# Patient Record
Sex: MALE | Race: WHITE | NOT HISPANIC OR LATINO | Employment: OTHER | URBAN - METROPOLITAN AREA
[De-identification: names, ages, dates, MRNs, and addresses within clinical notes are randomized per-mention and may not be internally consistent; named-entity substitution may affect disease eponyms.]

---

## 2022-12-29 LAB
ABO: NORMAL
ANTIBODY SCREEN: NORMAL
ESTIMATED AVERAGE GLUCOSE: 163 MG/DL
HBA1C MFR BLD: 7.3 %
RH TYPE: NORMAL

## 2023-09-16 ENCOUNTER — HOSPITAL ENCOUNTER (OUTPATIENT)
Facility: HOSPITAL | Age: 73
Setting detail: OUTPATIENT SURGERY
End: 2023-09-16
Attending: ORTHOPAEDIC SURGERY | Admitting: ORTHOPAEDIC SURGERY
Payer: MEDICARE

## 2023-10-22 PROBLEM — G47.33 OSA (OBSTRUCTIVE SLEEP APNEA): Status: ACTIVE | Noted: 2017-01-06

## 2023-10-22 PROBLEM — I87.2 STASIS DERMATITIS: Status: ACTIVE | Noted: 2023-10-22

## 2023-10-22 PROBLEM — I73.9 PVD (PERIPHERAL VASCULAR DISEASE) (CMS-HCC): Status: ACTIVE | Noted: 2021-02-24

## 2023-10-22 PROBLEM — N40.0 ENLARGED PROSTATE: Status: ACTIVE | Noted: 2023-10-22

## 2023-10-22 PROBLEM — R97.20 ELEVATED PSA: Status: ACTIVE | Noted: 2023-10-22

## 2023-10-22 PROBLEM — I10 ELEVATED BLOOD PRESSURE READING IN OFFICE WITH DIAGNOSIS OF HYPERTENSION: Status: ACTIVE | Noted: 2017-01-06

## 2023-10-22 PROBLEM — Z86.16 HISTORY OF COVID-19: Status: ACTIVE | Noted: 2022-03-18

## 2023-10-22 PROBLEM — E78.2 MIXED HYPERLIPIDEMIA: Status: ACTIVE | Noted: 2023-10-22

## 2023-10-22 PROBLEM — J30.9 ALLERGIC RHINITIS: Status: ACTIVE | Noted: 2023-10-22

## 2023-10-22 PROBLEM — N18.30 CKD (CHRONIC KIDNEY DISEASE), STAGE III (MULTI): Status: ACTIVE | Noted: 2017-01-06

## 2023-10-22 PROBLEM — E11.8 TYPE 2 DIABETES MELLITUS WITH COMPLICATION, WITHOUT LONG-TERM CURRENT USE OF INSULIN (MULTI): Status: ACTIVE | Noted: 2023-10-22

## 2023-10-22 PROBLEM — M79.3 LIPODERMATOSCLEROSIS: Status: ACTIVE | Noted: 2023-10-22

## 2023-10-22 PROBLEM — J44.89 COPD WITH ASTHMA (MULTI): Status: ACTIVE | Noted: 2023-10-22

## 2023-10-22 RX ORDER — ALBUTEROL SULFATE 0.83 MG/ML
2.5 SOLUTION RESPIRATORY (INHALATION) EVERY 6 HOURS PRN
COMMUNITY
Start: 2020-03-23 | End: 2023-12-14 | Stop reason: ENTERED-IN-ERROR

## 2023-10-22 RX ORDER — INSULIN DETEMIR 100 [IU]/ML
40 INJECTION, SOLUTION SUBCUTANEOUS NIGHTLY
COMMUNITY
Start: 2023-01-09 | End: 2023-10-24 | Stop reason: ENTERED-IN-ERROR

## 2023-10-22 RX ORDER — FELODIPINE 5 MG/1
2 TABLET, EXTENDED RELEASE ORAL DAILY
COMMUNITY
Start: 2022-09-06

## 2023-10-22 RX ORDER — EZETIMIBE 10 MG/1
1 TABLET ORAL
COMMUNITY

## 2023-10-22 RX ORDER — IBUPROFEN 200 MG
CAPSULE ORAL 2 TIMES DAILY
COMMUNITY
Start: 2023-02-01 | End: 2023-10-24 | Stop reason: ENTERED-IN-ERROR

## 2023-10-22 RX ORDER — DULAGLUTIDE 3 MG/.5ML
3 INJECTION, SOLUTION SUBCUTANEOUS WEEKLY
COMMUNITY

## 2023-10-22 RX ORDER — FLUTICASONE PROPIONATE AND SALMETEROL 500; 50 UG/1; UG/1
1 POWDER RESPIRATORY (INHALATION) 2 TIMES DAILY PRN
COMMUNITY

## 2023-10-22 RX ORDER — ALBUTEROL SULFATE 90 UG/1
2 AEROSOL, METERED RESPIRATORY (INHALATION) EVERY 4 HOURS PRN
COMMUNITY

## 2023-10-22 RX ORDER — SODIUM CHLORIDE 0.9 % (FLUSH) 0.9 %
10 SYRINGE (ML) INJECTION
COMMUNITY
Start: 2022-12-30 | End: 2023-10-24 | Stop reason: ENTERED-IN-ERROR

## 2023-10-22 RX ORDER — FUROSEMIDE 20 MG/1
TABLET ORAL AS NEEDED
COMMUNITY
Start: 2020-03-16 | End: 2023-10-24 | Stop reason: ENTERED-IN-ERROR

## 2023-10-22 RX ORDER — ACETAMINOPHEN 500 MG
1 TABLET ORAL DAILY
COMMUNITY

## 2023-10-22 RX ORDER — ATORVASTATIN CALCIUM 40 MG/1
1 TABLET, FILM COATED ORAL
COMMUNITY

## 2023-10-22 RX ORDER — ASCORBIC ACID 500 MG
500 TABLET ORAL
COMMUNITY

## 2023-10-22 RX ORDER — PREGABALIN 50 MG/1
50 CAPSULE ORAL 3 TIMES DAILY
COMMUNITY
Start: 2022-12-07 | End: 2023-12-14 | Stop reason: ENTERED-IN-ERROR

## 2023-10-22 RX ORDER — ATENOLOL 50 MG/1
1 TABLET ORAL DAILY
COMMUNITY
Start: 2023-03-27

## 2023-10-22 RX ORDER — LOSARTAN POTASSIUM AND HYDROCHLOROTHIAZIDE 25; 100 MG/1; MG/1
1 TABLET ORAL DAILY
COMMUNITY
Start: 2023-01-11

## 2023-10-24 ENCOUNTER — ANCILLARY PROCEDURE (OUTPATIENT)
Dept: PREADMISSION TESTING | Facility: HOSPITAL | Age: 73
End: 2023-10-24
Payer: MEDICARE

## 2023-10-24 ENCOUNTER — LAB (OUTPATIENT)
Dept: LAB | Facility: LAB | Age: 73
End: 2023-10-24
Payer: MEDICARE

## 2023-10-24 VITALS
SYSTOLIC BLOOD PRESSURE: 167 MMHG | TEMPERATURE: 97.9 F | HEIGHT: 72 IN | DIASTOLIC BLOOD PRESSURE: 73 MMHG | WEIGHT: 299.38 LBS | HEART RATE: 60 BPM | OXYGEN SATURATION: 97 % | BODY MASS INDEX: 40.55 KG/M2 | RESPIRATION RATE: 18 BRPM

## 2023-10-24 DIAGNOSIS — M17.11 UNILATERAL PRIMARY OSTEOARTHRITIS, RIGHT KNEE: ICD-10-CM

## 2023-10-24 DIAGNOSIS — Z01.818 PREOP TESTING: Primary | ICD-10-CM

## 2023-10-24 DIAGNOSIS — Z01.818 PREOP TESTING: ICD-10-CM

## 2023-10-24 LAB
ANION GAP SERPL CALC-SCNC: 13 MMOL/L (ref 10–20)
BUN SERPL-MCNC: 20 MG/DL (ref 6–23)
CALCIUM SERPL-MCNC: 9.1 MG/DL (ref 8.6–10.3)
CHLORIDE SERPL-SCNC: 101 MMOL/L (ref 98–107)
CO2 SERPL-SCNC: 28 MMOL/L (ref 21–32)
CREAT SERPL-MCNC: 1.14 MG/DL (ref 0.5–1.3)
ERYTHROCYTE [DISTWIDTH] IN BLOOD BY AUTOMATED COUNT: 12.2 % (ref 11.5–14.5)
GFR SERPL CREATININE-BSD FRML MDRD: 68 ML/MIN/1.73M*2
GLUCOSE SERPL-MCNC: 162 MG/DL (ref 74–99)
HCT VFR BLD AUTO: 39.7 % (ref 41–52)
HGB BLD-MCNC: 12.9 G/DL (ref 13.5–17.5)
MCH RBC QN AUTO: 29.5 PG (ref 26–34)
MCHC RBC AUTO-ENTMCNC: 32.5 G/DL (ref 32–36)
MCV RBC AUTO: 91 FL (ref 80–100)
NRBC BLD-RTO: 0 /100 WBCS (ref 0–0)
PLATELET # BLD AUTO: 260 X10*3/UL (ref 150–450)
PMV BLD AUTO: 10.6 FL (ref 7.5–11.5)
POTASSIUM SERPL-SCNC: 4.2 MMOL/L (ref 3.5–5.3)
RBC # BLD AUTO: 4.38 X10*6/UL (ref 4.5–5.9)
SODIUM SERPL-SCNC: 138 MMOL/L (ref 136–145)
WBC # BLD AUTO: 7.6 X10*3/UL (ref 4.4–11.3)

## 2023-10-24 PROCEDURE — 85027 COMPLETE CBC AUTOMATED: CPT

## 2023-10-24 PROCEDURE — 93010 ELECTROCARDIOGRAM REPORT: CPT | Performed by: INTERNAL MEDICINE

## 2023-10-24 PROCEDURE — 93005 ELECTROCARDIOGRAM TRACING: CPT

## 2023-10-24 PROCEDURE — 99202 OFFICE O/P NEW SF 15 MIN: CPT | Performed by: NURSE PRACTITIONER

## 2023-10-24 PROCEDURE — 87081 CULTURE SCREEN ONLY: CPT

## 2023-10-24 PROCEDURE — 36415 COLL VENOUS BLD VENIPUNCTURE: CPT

## 2023-10-24 PROCEDURE — 80048 BASIC METABOLIC PNL TOTAL CA: CPT

## 2023-10-24 RX ORDER — INSULIN DEGLUDEC 100 U/ML
40 INJECTION, SOLUTION SUBCUTANEOUS NIGHTLY
COMMUNITY
End: 2023-10-24 | Stop reason: ALTCHOICE

## 2023-10-24 RX ORDER — METFORMIN HYDROCHLORIDE 500 MG/1
1000 TABLET ORAL
COMMUNITY

## 2023-10-24 RX ORDER — CHLORHEXIDINE GLUCONATE ORAL RINSE 1.2 MG/ML
SOLUTION DENTAL
Qty: 473 ML | Refills: 0 | Status: SHIPPED | OUTPATIENT
Start: 2023-10-24 | End: 2024-01-04 | Stop reason: HOSPADM

## 2023-10-24 ASSESSMENT — ACTIVITIES OF DAILY LIVING (ADL): ADL_SCORE: 0

## 2023-10-24 ASSESSMENT — DUKE ACTIVITY SCORE INDEX (DASI)
CAN YOU DO YARD WORK LIKE RAKING LEAVES, WEEDING OR PUSHING A MOWER: YES
CAN YOU PARTICIPATE IN STRENOUS SPORTS LIKE SWIMMING, SINGLES TENNIS, FOOTBALL, BASKETBALL, OR SKIING: NO
DASI METS SCORE: 6.6
CAN YOU TAKE CARE OF YOURSELF (EAT, DRESS, BATHE, OR USE TOILET): YES
CAN YOU RUN A SHORT DISTANCE: NO
CAN YOU WALK INDOORS, SUCH AS AROUND YOUR HOUSE: YES
CAN YOU WALK A BLOCK OR TWO ON LEVEL GROUND: YES
TOTAL_SCORE: 31.45
CAN YOU CLIMB A FLIGHT OF STAIRS OR WALK UP A HILL: YES
CAN YOU DO HEAVY WORK AROUND THE HOUSE LIKE SCRUBBING FLOORS OR LIFTING AND MOVING HEAVY FURNITURE: YES
CAN YOU DO MODERATE WORK AROUND THE HOUSE LIKE VACUUMING, SWEEPING FLOORS OR CARRYING GROCERIES: YES
CAN YOU DO LIGHT WORK AROUND THE HOUSE LIKE DUSTING OR WASHING DISHES: YES
CAN YOU PARTICIPATE IN MODERATE RECREATIONAL ACTIVITIES LIKE GOLF, BOWLING, DANCING, DOUBLES TENNIS OR THROWING A BASEBALL OR FOOTBALL: NO
CAN YOU HAVE SEXUAL RELATIONS: NO

## 2023-10-24 ASSESSMENT — CHADS2 SCORE
CHADS2 SCORE: 2
PRIOR STROKE OR TIA OR THROMBOEMBOLISM: NO
HYPERTENSION: YES
DIABETES: YES
CHF: NO
AGE GREATER THAN OR EQUAL TO 75: NO

## 2023-10-24 ASSESSMENT — ENCOUNTER SYMPTOMS
EYES NEGATIVE: 1
SHORTNESS OF BREATH: 1
CARDIOVASCULAR NEGATIVE: 1
NEUROLOGICAL NEGATIVE: 1
CONSTITUTIONAL NEGATIVE: 1
GASTROINTESTINAL NEGATIVE: 1
PSYCHIATRIC NEGATIVE: 1
ARTHRALGIAS: 1

## 2023-10-24 ASSESSMENT — LIFESTYLE VARIABLES: SMOKING_STATUS: NONSMOKER

## 2023-10-24 ASSESSMENT — PAIN SCALES - GENERAL: PAINLEVEL_OUTOF10: 0 - NO PAIN

## 2023-10-24 ASSESSMENT — PAIN - FUNCTIONAL ASSESSMENT: PAIN_FUNCTIONAL_ASSESSMENT: 0-10

## 2023-10-24 NOTE — PREPROCEDURE INSTRUCTIONS
No outpatient medications have been marked as taking for the 10/24/23 encounter (Clinical Support) with [facility].                 PRE-OPERATIVE INSTRUCTIONS    You will receive notification one business day prior to your surgery to confirm your arrival time and additional information. It is important that you answer your phone and/or check your messages during this time.    Please enter the building through the Outpatient entrance. Take the elevator off the lobby to the 2nd floor and check in at the Outpatient Surgery desk    INSTRUCTIONS:  Talk to your surgeon for instructions if you should stop your aspirin, blood thinner, or diabetes medicines.  DO NOT take any multivitamins or over the counter supplements for 7-10 days before surgery.  If not being admitted, you must have an adult immediately available to drive you home after surgery. We also highly recommend you have someone stay with you for the entire day and night of your surgery.  For children having surgery, a parent or legal guardian must accompany t hem to the surgery center. If this is not possible, please call [phone] to make additional arrangements.  For adults who are unable to consent or make medical decisions for themselves, a legal guardian or Power of  must accompany them to the surgery center. If this is not possible, please call [phone] to make additional arrangements.  Wear comfortable, loose fitting clothing.  All jewelry and piercings must be removed. If you are unable to remove an item or have a dermal piercing, please be sure to tell the nurse when you arrive for surgery.  Nail polish and make-up must be removed.  Avoid smoking or consuming alcohol for 24 hours before surgery.  To help prevent infection, please take a shower/bath and wash your hair the night before and/or morning of surgery.    Additional instructions about eating and drinking before surgery:  Do not eat any solid foods or drink anything but clear  liquids within 6 hours of your arrival time for surgery. Milk, nutritional drinks/supplements, and infant formula are considered solid foods.  You may drink up to 12 oz. of clear liquids up to 2 hours before your arrival time for surgery, unless directed otherwise by your surgeon. Clear liquids include water, non-carbonated sports drinks (Gatorade), black tea or coffee (no creamers) and breast milk.    If you received a blue folder, please review additional information provided inside the folder regarding additional preparation.     If you have any questions or concerns, please call Pre-Admission Testing at [phone].

## 2023-10-24 NOTE — CPM/PAT H&P
CPM/PAT Evaluation       Name: Franklin Ronquillo)  /Age: 1950/73 y.o.     In-Person       Chief Complaint: Right knee replacement    HPI Patient presents for right total knee replacement 23. He had left total knee replacement done in January and denies complications. He is c/o right anterior knee pain worsening for years. He is ambulating unassisted. Denies cuts, bruises or wounds to right knee or RLE. Wears bilateral compression stockings that he has worn over the last 20 years.    Past Medical History:   Diagnosis Date    Asthma     CKD (chronic kidney disease)     COPD (chronic obstructive pulmonary disease) (CMS/[facility])     Diabetes mellitus (CMS/[facility])     Hyperlipidemia     Hypertension     PVD (peripheral vascular disease) (CMS/[facility])     Sleep apnea        Past Surgical History:   Procedure Laterality Date    CARPAL TUNNEL RELEASE Bilateral     ELBOW SURGERY      INSERT / REPLACE / REMOVE PACEMAKER      JOINT REPLACEMENT      SPINE SURGERY         Patient  reports that he is not currently sexually active.    Family History   Problem Relation Name Age of Onset    Cancer Mother      Coronary artery disease Father      Cancer Father         No Known Allergies    Prior to Admission medications    Medication Sig Start Date End Date Taking? Authorizing Provider   albuterol 2.5 mg /3 mL (0.083 %) nebulizer solution Inhale 3 mL (2.5 mg) every 6 hours if needed for shortness of breath or wheezing. Inhale by nebulizer over 5-15 minutes 3/23/20   Historical Provider, MD   albuterol 90 mcg/actuation inhaler 2 puffs every 4 hours if needed for wheezing or shortness of breath. 1/3/23   Historical Provider, MD   ascorbic acid (Vitamin C) 500 mg tablet Take 1 tablet (500 mg) by mouth once daily.    Historical Provider, MD   atenolol (Tenormin) 50 mg tablet Take 1 tablet (50 mg) by mouth once daily. 3/27/23   Historical Provider, MD   atorvastatin (Lipitor) 40 mg tablet Take 1 tablet (40 mg) by mouth  once daily. 4/12/23   Historical Provider, MD   chlorhexidine (Peridex) 0.12 % solution 15 milliliter(s) orally once a day for 2 doses 15 ml  the night before surgery and 15 ml morning of surgery - swish for 30 seconds -DO NOT SWALLOW, SPIT OUT 10/24/23   MUMTAZ Almanza-CNP   cholecalciferol (Vitamin D-3) 50 mcg (2,000 unit) capsule Take 1 capsule (2,000 Units) by mouth early in the morning..    Historical Provider, MD   dulaglutide (Trulicity) 3 mg/0.5 mL pen injector Inject 3 mg under the skin once a week. 3/22/22   Historical Provider, MD   ezetimibe (Zetia) 10 mg tablet Take 1 tablet (10 mg) by mouth once daily. 10/10/22   Historical Provider, MD   felodipine ER (Plendil) 5 mg 24 hr tablet Take 2 tablets (10 mg) by mouth once daily. 9/6/22   Historical Provider, MD   fexofenadine HCl (ALLEGRA ORAL) Take 180 mg by mouth once daily.    Historical Provider, MD   fluticasone propion-salmeteroL (Wixela Inhub) 500-50 mcg/dose diskus inhaler Inhale 1 puff 2 times a day. As instructed 4/24/23   Historical Provider, MD   GARLIC ORAL Take 1 tablet by mouth once daily.    Historical Provider, MD   insulin degludec (Tresiba U-100 Insulin) 100 unit/mL injection Inject 40 Units under the skin once daily at bedtime. Take as directed per insulin instructions.    Historical Provider, MD   losartan-hydrochlorothiazide (Hyzaar) 100-25 mg tablet Take 1 tablet by mouth once daily. 1/11/23   Historical Provider, MD   pregabalin (Lyrica) 50 mg capsule Take 1 capsule (50 mg) by mouth 3 times a day. For 90 days 12/7/22   Historical Provider, MD   vitamin E acetate (VITAMIN E ORAL) Take 1,000 Units by mouth once daily.    Historical Provider, MD   blood sugar diagnostic (Blood Glucose Test) strip 2 times a day. As directed 2/1/23 10/24/23  Historical Provider, MD   furosemide (Lasix) 20 mg tablet if needed (swelling). 3/16/20 10/24/23  Historical Provider, MD   insulin detemir (Levemir FlexPen) 100 unit/mL (3 mL) pen Inject 40  Units under the skin once daily at bedtime. 1/9/23 10/24/23  Historical Provider, MD   PERFLUTREN LIPID MICROSPHERES IV Infuse into a venous catheter.  perflutren lipid microspheres 1.3 mL in NaCl (PF) 0.9% 10 mL injection (DEFINITY) 12/30/22 10/24/23  Historical Provider, MD   sodium chloride 0.9% (sodium chloride) flush Infuse 10 mL into a venous catheter. 12/30/22 10/24/23  Historical Provider, MD      Refer to updated medication list on file    Review of Systems   Constitutional: Negative.    HENT: Negative.     Eyes: Negative.         Glasses   Respiratory:  Positive for shortness of breath (chronic with exertion).    Cardiovascular: Negative.         Wearing bilateral compression stockings   Gastrointestinal: Negative.    Genitourinary: Negative.    Musculoskeletal:  Positive for arthralgias.   Skin: Negative.    Neurological: Negative.    Psychiatric/Behavioral: Negative.          Physical Exam  Constitutional:       Appearance: Normal appearance.   HENT:      Head: Normocephalic.   Eyes:      Extraocular Movements: Extraocular movements intact.   Cardiovascular:      Rate and Rhythm: Normal rate and regular rhythm.      Heart sounds: Normal heart sounds.   Pulmonary:      Effort: Pulmonary effort is normal.      Breath sounds: Normal breath sounds.   Abdominal:      General: Bowel sounds are normal.      Palpations: Abdomen is soft.   Musculoskeletal:         General: Normal range of motion.      Cervical back: Normal range of motion.   Skin:     General: Skin is warm and dry.   Neurological:      Mental Status: He is alert and oriented to person, place, and time.   Psychiatric:         Mood and Affect: Mood normal.          PAT AIRWAY:   Airway:     Neck ROM::  Full   Several missing teeth, denies loose teeth    Anesthesia:  Patient denies any anesthesia complications.     Visit Vitals  /73   Pulse 60   Temp 36.6 °C (97.9 °F) (Temporal)   Resp 18       DASI Risk Score      Flowsheet Row Most Recent  Value   DASI SCORE 31.45   METS Score (Will be calculated only when all the questions are answered) 6.6          Caprini DVT Assessment      Flowsheet Row Most Recent Value   DVT Score 16   Current Status Major surgery planned, including arthroscopic and laproscopic (1-2 hours), Elective major lower extremity arthroplasty, Swollen legs, COPD   History Prior major surgery, COPD   Age 60-75 years   BMI 31-40 (Obesity)          Modified Frailty Index      Flowsheet Row Most Recent Value   Modified Frailty Index Calculator .3636          CHADS2 Stroke Risk  Current as of 3 minutes ago        N/A 3 - 100%: High Risk   2 - 3%: Medium Risk   0 - 2%: Low Risk     Last Change: N/A          This score determines the patient's risk of having a stroke if the patient has atrial fibrillation.        This score is not applicable to this patient. Components are not calculated.          Revised Cardiac Risk Index    No data to display       Apfel Simplified Score    No data to display       Risk Analysis Index Results This Encounter         10/24/2023  1349             GOODMAN Cancer History: Patient does not indicate history of cancer    Total Risk Analysis Index Score Without Cancer: 33    Total Risk Analysis Index Score: 33          Stop Bang Score      Flowsheet Row Most Recent Value   Do you snore loudly? 0   Do you often feel tired or fatigued after your sleep? 0   Has anyone ever observed you stop breathing in your sleep? 1   Do you have or are you being treated for high blood pressure? 1   Recent BMI (Calculated) 40.6   Is BMI greater than 35 kg/m2? 1=Yes   Age older than 50 years old? 1=Yes   Is your neck circumference greater than 17 inches (Male) or 16 inches (Female)? 1          Hemoglobin A1C   Date Value Ref Range Status   07/31/2023 7.6 (H) 4.3 - 5.6 % Final     Comment:     American Diabetes Association guidelines indicate that patients with HgbA1c in the range 5.7-6.4% are at increased risk for development of diabetes,  and intervention by lifestyle modification may be beneficial. HgbA1c greater or equal to 6.5% is considered diagnostic of diabetes.       Assessment and Plan:     73 year old male for right total knee replacement 11/6/23  EKG obtained and enclosed with comparison EKG 12/2022 on file  BMP and CBC today  MRSA swab and oral mouth rinse as ordered per protocol  Sleep apnea uses CPAP  Has pacemaker  Follows with cardiologist Dr. Olivia along with Dr. Lujan. Preop cardiovascular exam 12/30/22 on file  ECHO report on file 1/3/23  He is instructed to notify surgeon office if any new skin changes occur to surgical limb.

## 2023-10-26 LAB
ATRIAL RATE: 60 BPM
PR INTERVAL: 328 MS
Q ONSET: 216 MS
QRS COUNT: 10 BEATS
QRS DURATION: 138 MS
QT INTERVAL: 442 MS
QTC CALCULATION(BAZETT): 442 MS
QTC FREDERICIA: 442 MS
R AXIS: -86 DEGREES
STAPHYLOCOCCUS SPEC CULT: NORMAL
T AXIS: 76 DEGREES
T OFFSET: 437 MS
VENTRICULAR RATE: 60 BPM

## 2023-12-14 ENCOUNTER — LAB (OUTPATIENT)
Dept: LAB | Facility: LAB | Age: 73
End: 2023-12-14
Payer: MEDICARE

## 2023-12-14 ENCOUNTER — PRE-ADMISSION TESTING (OUTPATIENT)
Dept: PREADMISSION TESTING | Facility: HOSPITAL | Age: 73
End: 2023-12-14
Payer: MEDICARE

## 2023-12-14 VITALS
BODY MASS INDEX: 41.65 KG/M2 | RESPIRATION RATE: 16 BRPM | DIASTOLIC BLOOD PRESSURE: 77 MMHG | HEART RATE: 66 BPM | HEIGHT: 72 IN | SYSTOLIC BLOOD PRESSURE: 159 MMHG | OXYGEN SATURATION: 97 % | TEMPERATURE: 97 F | WEIGHT: 307.5 LBS

## 2023-12-14 DIAGNOSIS — M17.11 UNILATERAL PRIMARY OSTEOARTHRITIS, RIGHT KNEE: ICD-10-CM

## 2023-12-14 DIAGNOSIS — Z01.818 PRE-OP TESTING: ICD-10-CM

## 2023-12-14 DIAGNOSIS — Z01.818 PRE-OP TESTING: Primary | ICD-10-CM

## 2023-12-14 LAB
ANION GAP SERPL CALC-SCNC: 14 MMOL/L (ref 10–20)
BUN SERPL-MCNC: 21 MG/DL (ref 6–23)
CALCIUM SERPL-MCNC: 9.6 MG/DL (ref 8.6–10.3)
CHLORIDE SERPL-SCNC: 100 MMOL/L (ref 98–107)
CO2 SERPL-SCNC: 29 MMOL/L (ref 21–32)
CREAT SERPL-MCNC: 1.18 MG/DL (ref 0.5–1.3)
ERYTHROCYTE [DISTWIDTH] IN BLOOD BY AUTOMATED COUNT: 12.4 % (ref 11.5–14.5)
GFR SERPL CREATININE-BSD FRML MDRD: 65 ML/MIN/1.73M*2
GLUCOSE SERPL-MCNC: 234 MG/DL (ref 74–99)
HCT VFR BLD AUTO: 40.8 % (ref 41–52)
HGB BLD-MCNC: 13.3 G/DL (ref 13.5–17.5)
MCH RBC QN AUTO: 29.1 PG (ref 26–34)
MCHC RBC AUTO-ENTMCNC: 32.6 G/DL (ref 32–36)
MCV RBC AUTO: 89 FL (ref 80–100)
NRBC BLD-RTO: 0 /100 WBCS (ref 0–0)
PLATELET # BLD AUTO: 241 X10*3/UL (ref 150–450)
POTASSIUM SERPL-SCNC: 4.6 MMOL/L (ref 3.5–5.3)
RBC # BLD AUTO: 4.57 X10*6/UL (ref 4.5–5.9)
SODIUM SERPL-SCNC: 138 MMOL/L (ref 136–145)
WBC # BLD AUTO: 7.2 X10*3/UL (ref 4.4–11.3)

## 2023-12-14 PROCEDURE — 85027 COMPLETE CBC AUTOMATED: CPT

## 2023-12-14 PROCEDURE — 93005 ELECTROCARDIOGRAM TRACING: CPT

## 2023-12-14 PROCEDURE — 80048 BASIC METABOLIC PNL TOTAL CA: CPT

## 2023-12-14 PROCEDURE — 99203 OFFICE O/P NEW LOW 30 MIN: CPT | Performed by: NURSE PRACTITIONER

## 2023-12-14 PROCEDURE — 87081 CULTURE SCREEN ONLY: CPT | Mod: STJLAB

## 2023-12-14 PROCEDURE — 36415 COLL VENOUS BLD VENIPUNCTURE: CPT

## 2023-12-14 PROCEDURE — 93010 ELECTROCARDIOGRAM REPORT: CPT | Performed by: INTERNAL MEDICINE

## 2023-12-14 RX ORDER — FAMOTIDINE 20 MG/1
20 TABLET, FILM COATED ORAL DAILY
COMMUNITY

## 2023-12-14 RX ORDER — INSULIN DEGLUDEC 100 U/ML
40 INJECTION, SOLUTION SUBCUTANEOUS NIGHTLY
COMMUNITY

## 2023-12-14 RX ORDER — NYSTATIN AND TRIAMCINOLONE ACETONIDE 100000; 1 [USP'U]/G; MG/G
1 CREAM TOPICAL 2 TIMES DAILY
COMMUNITY

## 2023-12-14 RX ORDER — PREGABALIN 75 MG/1
75 CAPSULE ORAL 3 TIMES DAILY
COMMUNITY

## 2023-12-14 RX ORDER — ALBUTEROL SULFATE 0.83 MG/ML
2.5 SOLUTION RESPIRATORY (INHALATION) EVERY 8 HOURS PRN
COMMUNITY

## 2023-12-14 ASSESSMENT — DUKE ACTIVITY SCORE INDEX (DASI)
DASI METS SCORE: 6.3
CAN YOU DO HEAVY WORK AROUND THE HOUSE LIKE SCRUBBING FLOORS OR LIFTING AND MOVING HEAVY FURNITURE: NO
TOTAL_SCORE: 28.7
CAN YOU WALK A BLOCK OR TWO ON LEVEL GROUND: YES
CAN YOU DO YARD WORK LIKE RAKING LEAVES, WEEDING OR PUSHING A MOWER: YES
CAN YOU DO LIGHT WORK AROUND THE HOUSE LIKE DUSTING OR WASHING DISHES: YES
CAN YOU TAKE CARE OF YOURSELF (EAT, DRESS, BATHE, OR USE TOILET): YES
CAN YOU PARTICIPATE IN STRENOUS SPORTS LIKE SWIMMING, SINGLES TENNIS, FOOTBALL, BASKETBALL, OR SKIING: NO
CAN YOU PARTICIPATE IN MODERATE RECREATIONAL ACTIVITIES LIKE GOLF, BOWLING, DANCING, DOUBLES TENNIS OR THROWING A BASEBALL OR FOOTBALL: NO
CAN YOU HAVE SEXUAL RELATIONS: YES
CAN YOU WALK INDOORS, SUCH AS AROUND YOUR HOUSE: YES
CAN YOU CLIMB A FLIGHT OF STAIRS OR WALK UP A HILL: YES
CAN YOU DO MODERATE WORK AROUND THE HOUSE LIKE VACUUMING, SWEEPING FLOORS OR CARRYING GROCERIES: YES
CAN YOU RUN A SHORT DISTANCE: NO

## 2023-12-14 ASSESSMENT — CHADS2 SCORE
HYPERTENSION: YES
PRIOR STROKE OR TIA OR THROMBOEMBOLISM: NO
DIABETES: YES
CHF: NO
CHADS2 SCORE: 2
AGE GREATER THAN OR EQUAL TO 75: NO

## 2023-12-14 ASSESSMENT — LIFESTYLE VARIABLES: SMOKING_STATUS: NONSMOKER

## 2023-12-14 ASSESSMENT — PAIN - FUNCTIONAL ASSESSMENT: PAIN_FUNCTIONAL_ASSESSMENT: 0-10

## 2023-12-14 ASSESSMENT — PAIN SCALES - GENERAL: PAINLEVEL_OUTOF10: 0 - NO PAIN

## 2023-12-14 ASSESSMENT — ACTIVITIES OF DAILY LIVING (ADL): ADL_SCORE: 0

## 2023-12-14 NOTE — CPM/PAT H&P
CPM/PAT Evaluation       Name: Franklin Ronquillo (Franklin Ronquillo)  /Age: 1950/73 y.o.     In-Person       Chief Complaint: right knee pains    HPI    CF is a 72 yo male who has been having right knee pains for several years and now pain has worsened- imaging shows right knee OA- subsequently scheduled for right TKR.  Original surgery scheduled for 2023 but consequently acquired COVID and surgery had to be canceled and rescheduled-upcoming surgery date is in 2024. No recent steroid injections. Skin intact. Otherwise denies any recent illness, fever/chills, chest pains or shortness of breath.     Past Medical History:   Diagnosis Date    Arthritis     Asthma     CKD (chronic kidney disease)     COPD (chronic obstructive pulmonary disease) (CMS/HCC)     Diabetes mellitus (CMS/HCC)     Hyperlipidemia     Hypertension     Intermittent complete heart block (CMS/HCC)     Lipodermatosclerosis     Lumbar disc disease     Lumbar spinal stenosis     Pacemaker     PVD (peripheral vascular disease) (CMS/HCC)     Sinus node dysfunction (CMS/HCC)     Sleep apnea     with cpap     PCP: Dr. Leung  Cards: Dr. Olivia  EP: Dr. Hines    Echo   - The left ventricle is normal in size. Left ventricular systolic function is   normal. EF = 62 ± 5% (2D biplane)   - The right ventricle is normal in size. Right ventricular systolic function is   normal.   - The left atrial cavity is mildly dilated.   - The right atrial cavity is dilated.   - The visualized aorta is borderline dilated with a maximal dimension of 4.0 cm.     Oct 2023 Device check:     Device Type:dual-chamber pacemaker     :  [employer]     Full device integration was done.     Device function was normal Yes.     Battery voltage within acceptable range     The underlying rhythm is Sinus bradycardia with AV block     Arrhythmia log reveals no significant episodes  (he had few seconds of A-fib)     The skin on top of the device appears  to be intact without signs of infection    Hemoglobin A1C   Date Value Ref Range Status   07/31/2023 7.6 (H) 4.3 - 5.6 % Final     Comment:     American Diabetes Association guidelines indicate that patients with HgbA1c in the range 5.7-6.4% are at increased risk for development of diabetes, and intervention by lifestyle modification may be beneficial. HgbA1c greater or equal to 6.5% is considered diagnostic of diabetes.           Past Surgical History:   Procedure Laterality Date    CARPAL TUNNEL RELEASE Bilateral     COLONOSCOPY      ELBOW SURGERY      INSERT / REPLACE / REMOVE PACEMAKER      JOINT REPLACEMENT      SPINE SURGERY         Patient  reports that he is not currently sexually active.    Family History   Problem Relation Name Age of Onset    Cancer Mother      Coronary artery disease Father      Cancer Father      Diabetes Father         No Known Allergies    Prior to Admission medications    Medication Sig Start Date End Date Taking? Authorizing Provider   albuterol 90 mcg/actuation inhaler 2 puffs every 4 hours if needed for wheezing or shortness of breath.    Historical Provider, MD   ascorbic acid (Vitamin C) 500 mg tablet Take 1 tablet (500 mg) by mouth once daily.    Historical Provider, MD   atenolol (Tenormin) 50 mg tablet Take 1 tablet (50 mg) by mouth once daily. 3/27/23   Historical Provider, MD   atorvastatin (Lipitor) 40 mg tablet Take 1 tablet (40 mg) by mouth. In the afternoon    Historical Provider, MD   chlorhexidine (Peridex) 0.12 % solution 15 milliliter(s) orally once a day for 2 doses 15 ml  the night before surgery and 15 ml morning of surgery - swish for 30 seconds -DO NOT SWALLOW, SPIT OUT 10/24/23   MUMTAZ Almanza-CNP   cholecalciferol (Vitamin D-3) 50 mcg (2,000 unit) capsule Take 1 capsule (2,000 Units) by mouth early in the morning..    Historical Provider, MD   dulaglutide (Trulicity) 3 mg/0.5 mL pen injector Inject 3 mg under the skin once a week. Wednesdays     Historical Provider, MD   ezetimibe (Zetia) 10 mg tablet Take 1 tablet (10 mg) by mouth. In the afternoon    Historical Provider, MD   felodipine ER (Plendil) 5 mg 24 hr tablet Take 2 tablets (10 mg) by mouth once daily. 9/6/22   Historical Provider, MD   fexofenadine HCl (ALLEGRA ORAL) Take 180 mg by mouth once daily.    Historical Provider, MD   fluticasone propion-salmeteroL (Wixela Inhub) 500-50 mcg/dose diskus inhaler Inhale 1 puff 2 times a day.    Historical Provider, MD   garlic tablet Take 1 tablet by mouth once daily.    Historical Provider, MD   insulin degludec (Tresiba FlexTouch U-100) 100 unit/mL (3 mL) injection Inject 40 Units under the skin once daily at bedtime. Take as directed per insulin instructions.    Historical Provider, MD   losartan-hydrochlorothiazide (Hyzaar) 100-25 mg tablet Take 1 tablet by mouth once daily. 1/11/23   Historical Provider, MD   metFORMIN (Glucophage) 500 mg tablet Take 2 tablets (1,000 mg) by mouth 2 times a day with meals.    Historical Provider, MD   nystatin-triamcinolone (Mycolog II) cream Apply 1 Application topically 2 times a day.    Historical Provider, MD   omega 3-dha-epa-fish oil-krill 700 mg-600 mg- 900 mg capsule Take 1 capsule by mouth once daily.    Historical Provider, MD   pregabalin (Lyrica) 75 mg capsule Take 1 capsule (75 mg) by mouth 3 times a day.    Historical Provider, MD   vitamin E acetate (VITAMIN E ORAL) Take 1 capsule by mouth once daily.    Historical Provider, MD   albuterol 2.5 mg /3 mL (0.083 %) nebulizer solution Inhale 3 mL (2.5 mg) every 6 hours if needed for shortness of breath or wheezing. Inhale by nebulizer over 5-15 minutes 3/23/20 12/14/23  Historical Provider, MD   pregabalin (Lyrica) 50 mg capsule Take 1 capsule (50 mg) by mouth 3 times a day. For 90 days 12/7/22 12/14/23  Historical ProviderMD GAURANG   Constitutional: Negative for fever, chills, or sweats   ENMT: Negative for nasal discharge, congestion, ear pain,  mouth pain, throat pain   Respiratory: Negative for cough, wheezing, shortness of breath   Cardiac: Negative for chest pain, dyspnea on exertion, palpitations   Gastrointestinal: Negative for nausea, vomiting, diarrhea, constipation, abdominal pain  Genitourinary: Negative for dysuria, flank pain, frequency, hematuria     Musculoskeletal: Positive for decreased ROM, pain, swelling, weakness in right knee     Neurological: Negative for dizziness, confusion, headache  Psychiatric: Negative for mood changes   Skin: Negative for itching, rash, ulcer    Hematologic/Lymph: Negative for bruising, easy bleeding  Allergic/Immunologic: Negative itching, sneezing, swelling      Physical Exam  HENT:      Head: Normocephalic.      Mouth/Throat:      Mouth: Mucous membranes are moist.   Eyes:      Extraocular Movements: Extraocular movements intact.   Cardiovascular:      Rate and Rhythm: Normal rate and regular rhythm.   Pulmonary:      Effort: Pulmonary effort is normal.      Breath sounds: Normal breath sounds.   Abdominal:      General: Abdomen is flat.      Palpations: Abdomen is soft.   Musculoskeletal:         General: Normal range of motion.      Cervical back: Normal range of motion.   Skin:     General: Skin is warm and dry.   Neurological:      General: No focal deficit present.      Mental Status: He is alert.   Psychiatric:         Mood and Affect: Mood normal.          PAT AIRWAY:   Airway:     Neck ROM::  Full   Broken teeth  normal      Anesthesia:  Patient denies any anesthesia complications.      Visit Vitals  /77   Pulse 66   Temp 36.1 °C (97 °F) (Temporal)   Resp 16       DASI Risk Score      Flowsheet Row Most Recent Value   DASI SCORE 28.7   METS Score (Will be calculated only when all the questions are answered) 6.3          Caprini DVT Assessment      Flowsheet Row Most Recent Value   DVT Score 15   Current Status Major surgery planned, including arthroscopic and laproscopic (1-2 hours), Swollen  legs, Elective major lower extremity arthroplasty   History Prior major surgery, COPD   Age 60-75 years   BMI 31-40 (Obesity)          Modified Frailty Index      Flowsheet Row Most Recent Value   Modified Frailty Index Calculator .2727          CHADS2 Stroke Risk  Current as of 6 minutes ago        N/A 3 - 100%: High Risk   2 - 3%: Medium Risk   0 - 2%: Low Risk     Last Change: N/A          This score determines the patient's risk of having a stroke if the patient has atrial fibrillation.        This score is not applicable to this patient. Components are not calculated.          Revised Cardiac Risk Index    No data to display       Apfel Simplified Score      Flowsheet Row Most Recent Value   Apfel Simplified Score Calculator 1          Risk Analysis Index Results This Encounter         12/14/2023  1318             GOODMAN Cancer History: Patient does not indicate history of cancer    Total Risk Analysis Index Score Without Cancer: 25    Total Risk Analysis Index Score: 25          Stop Bang Score      Flowsheet Row Most Recent Value   Do you snore loudly? 0   Do you often feel tired or fatigued after your sleep? 0   Has anyone ever observed you stop breathing in your sleep? 1   Do you have or are you being treated for high blood pressure? 1   Recent BMI (Calculated) 40.6   Is BMI greater than 35 kg/m2? 1=Yes   Age older than 50 years old? 1=Yes   Is your neck circumference greater than 17 inches (Male) or 16 inches (Female)? 1   Gender - Male 1=Yes   STOP-BANG Total Score 6            Assessment and Plan:     74 yo male scheduled for right total knee replacement on 1/3/2024 with Dr. Anaya. Blood work and MRSA ordered- oral chlorhexidine at home from previous prescription. EKG shows wide rhythm with left axis- pacemaker not being read but patient has ventricular pacemaker- comparable EKG from Oct 2023 on file.  Cardiac clearance from Dr. Olivia on file as well. Otherwise no further orders indicated. ASA3: A to  review    See risk scores as previously documented.

## 2023-12-14 NOTE — PREPROCEDURE INSTRUCTIONS
Medication List            Accurate as of December 14, 2023  1:52 PM. Always use your most recent med list.                * albuterol 90 mcg/actuation inhaler  Notes to patient: May use morning of surgery     * albuterol 2.5 mg /3 mL (0.083 %) nebulizer solution  Notes to patient: May use morning of surgery     ALLEGRA ORAL  Medication Adjustments for Surgery: Take morning of surgery with sip of water, no other fluids     ascorbic acid 500 mg tablet  Commonly known as: Vitamin C  Medication Adjustments for Surgery: Stop 7 days before surgery     atenolol 50 mg tablet  Commonly known as: Tenormin  Medication Adjustments for Surgery: Take morning of surgery with sip of water, no other fluids     atorvastatin 40 mg tablet  Commonly known as: Lipitor  Medication Adjustments for Surgery: Take morning of surgery with sip of water, no other fluids     chlorhexidine 0.12 % solution  Commonly known as: Peridex  15 milliliter(s) orally once a day for 2 doses 15 ml  the night before surgery and 15 ml morning of surgery - swish for 30 seconds -DO NOT SWALLOW, SPIT OUT  Notes to patient: As indicated     cholecalciferol 50 mcg (2,000 unit) capsule  Commonly known as: Vitamin D-3  Medication Adjustments for Surgery: Stop 7 days before surgery     ezetimibe 10 mg tablet  Commonly known as: Zetia  Medication Adjustments for Surgery: Stop 1 day before surgery     famotidine 20 mg tablet  Commonly known as: Pepcid  Medication Adjustments for Surgery: Take morning of surgery with sip of water, no other fluids     felodipine ER 5 mg 24 hr tablet  Commonly known as: Plendil  Medication Adjustments for Surgery: Take morning of surgery with sip of water, no other fluids     garlic tablet  Medication Adjustments for Surgery: Stop 7 days before surgery     losartan-hydrochlorothiazide 100-25 mg tablet  Commonly known as: Hyzaar  Medication Adjustments for Surgery: Stop 1 day before surgery     metFORMIN 500 mg tablet  Commonly known as:  Glucophage  Medication Adjustments for Surgery: Continue until night before surgery     nystatin-triamcinolone cream  Commonly known as: Mycolog II  Medication Adjustments for Surgery: Continue until night before surgery     omega 3-dha-epa-fish oil-krill 700 mg-600 mg- 900 mg capsule  Medication Adjustments for Surgery: Stop 7 days before surgery     pregabalin 75 mg capsule  Commonly known as: Lyrica  Medication Adjustments for Surgery: Take morning of surgery with sip of water, no other fluids     Tresiba FlexTouch U-100 100 unit/mL (3 mL) injection  Generic drug: insulin degludec  Notes to patient: Administer HALF dose the morning of surgery     Trulicity 3 mg/0.5 mL pen injector  Generic drug: dulaglutide  Medication Adjustments for Surgery: Stop 7 days before surgery     VITAMIN E ORAL  Medication Adjustments for Surgery: Stop 7 days before surgery     Wixela Inhub 500-50 mcg/dose diskus inhaler  Generic drug: fluticasone propion-salmeteroL  Medication Adjustments for Surgery: Other (Comment)  Notes to patient: May use morning of surgery           * This list has 2 medication(s) that are the same as other medications prescribed for you. Read the directions carefully, and ask your doctor or other care provider to review them with you.                  PRE-OPERATIVE INSTRUCTIONS    You will receive notification one business day prior to your surgery to confirm your arrival time and additional information. It is important that you answer your phone and/or check your messages during this time.    Please enter the building through the Outpatient entrance. Take the elevator off the lobby to the 2nd floor and check in at the Outpatient Surgery desk    INSTRUCTIONS:  Talk to your surgeon for instructions if you should stop your aspirin, blood thinner, or diabetes medicines.  DO NOT take any multivitamins or over the counter supplements for 7-10 days before surgery.  If not being admitted, you must have an adult  immediately available to drive you home after surgery. We also highly recommend you have someone stay with you for the entire day and night of your surgery.  For children having surgery, a parent or legal guardian must accompany t hem to the surgery center. If this is not possible, please call [phone] to make additional arrangements.  For adults who are unable to consent or make medical decisions for themselves, a legal guardian or Power of  must accompany them to the surgery center. If this is not possible, please call [phone] to make additional arrangements.  Wear comfortable, loose fitting clothing.  All jewelry and piercings must be removed. If you are unable to remove an item or have a dermal piercing, please be sure to tell the nurse when you arrive for surgery.  Nail polish and make-up must be removed.  Avoid smoking or consuming alcohol for 24 hours before surgery.  To help prevent infection, please take a shower/bath and wash your hair the night before and/or morning of surgery.    Additional instructions about eating and drinking before surgery:  Do not eat any solid foods after midnight. Milk, nutritional drinks/supplements, and infant formula are considered solid foods.  You may drink up to 12 oz. of clear liquids up to 2 hours before your arrival time for surgery, unless directed otherwise by your surgeon. Clear liquids include water, non-carbonated sports drinks (Gatorade), black tea or coffee (no creamers) and breast milk.    If you received a blue folder, please review additional information provided inside the folder regarding additional preparation.     If you have any questions or concerns, please call Pre-Admission Testing at [phone].

## 2023-12-14 NOTE — H&P (VIEW-ONLY)
CPM/PAT Evaluation       Name: Franklin Ronquillo (Franklin Ronquillo)  /Age: 1950/73 y.o.     In-Person       Chief Complaint: right knee pains    HPI    CF is a 72 yo male who has been having right knee pains for several years and now pain has worsened- imaging shows right knee OA- subsequently scheduled for right TKR.  Original surgery scheduled for 2023 but consequently acquired COVID and surgery had to be canceled and rescheduled-upcoming surgery date is in 2024. No recent steroid injections. Skin intact. Otherwise denies any recent illness, fever/chills, chest pains or shortness of breath.     Past Medical History:   Diagnosis Date    Arthritis     Asthma     CKD (chronic kidney disease)     COPD (chronic obstructive pulmonary disease) (CMS/HCC)     Diabetes mellitus (CMS/HCC)     Hyperlipidemia     Hypertension     Intermittent complete heart block (CMS/HCC)     Lipodermatosclerosis     Lumbar disc disease     Lumbar spinal stenosis     Pacemaker     PVD (peripheral vascular disease) (CMS/HCC)     Sinus node dysfunction (CMS/HCC)     Sleep apnea     with cpap     PCP: Dr. Leung  Cards: Dr. Olivia  EP: Dr. Hines    Echo   - The left ventricle is normal in size. Left ventricular systolic function is   normal. EF = 62 ± 5% (2D biplane)   - The right ventricle is normal in size. Right ventricular systolic function is   normal.   - The left atrial cavity is mildly dilated.   - The right atrial cavity is dilated.   - The visualized aorta is borderline dilated with a maximal dimension of 4.0 cm.     Oct 2023 Device check:     Device Type:dual-chamber pacemaker     :  YOU[employer]ite     Full device integration was done.     Device function was normal Yes.     Battery voltage within acceptable range     The underlying rhythm is Sinus bradycardia with AV block     Arrhythmia log reveals no significant episodes  (he had few seconds of A-fib)     The skin on top of the device appears  to be intact without signs of infection    Hemoglobin A1C   Date Value Ref Range Status   07/31/2023 7.6 (H) 4.3 - 5.6 % Final     Comment:     American Diabetes Association guidelines indicate that patients with HgbA1c in the range 5.7-6.4% are at increased risk for development of diabetes, and intervention by lifestyle modification may be beneficial. HgbA1c greater or equal to 6.5% is considered diagnostic of diabetes.           Past Surgical History:   Procedure Laterality Date    CARPAL TUNNEL RELEASE Bilateral     COLONOSCOPY      ELBOW SURGERY      INSERT / REPLACE / REMOVE PACEMAKER      JOINT REPLACEMENT      SPINE SURGERY         Patient  reports that he is not currently sexually active.    Family History   Problem Relation Name Age of Onset    Cancer Mother      Coronary artery disease Father      Cancer Father      Diabetes Father         No Known Allergies    Prior to Admission medications    Medication Sig Start Date End Date Taking? Authorizing Provider   albuterol 90 mcg/actuation inhaler 2 puffs every 4 hours if needed for wheezing or shortness of breath.    Historical Provider, MD   ascorbic acid (Vitamin C) 500 mg tablet Take 1 tablet (500 mg) by mouth once daily.    Historical Provider, MD   atenolol (Tenormin) 50 mg tablet Take 1 tablet (50 mg) by mouth once daily. 3/27/23   Historical Provider, MD   atorvastatin (Lipitor) 40 mg tablet Take 1 tablet (40 mg) by mouth. In the afternoon    Historical Provider, MD   chlorhexidine (Peridex) 0.12 % solution 15 milliliter(s) orally once a day for 2 doses 15 ml  the night before surgery and 15 ml morning of surgery - swish for 30 seconds -DO NOT SWALLOW, SPIT OUT 10/24/23   MUMTAZ Almanza-CNP   cholecalciferol (Vitamin D-3) 50 mcg (2,000 unit) capsule Take 1 capsule (2,000 Units) by mouth early in the morning..    Historical Provider, MD   dulaglutide (Trulicity) 3 mg/0.5 mL pen injector Inject 3 mg under the skin once a week. Wednesdays     Historical Provider, MD   ezetimibe (Zetia) 10 mg tablet Take 1 tablet (10 mg) by mouth. In the afternoon    Historical Provider, MD   felodipine ER (Plendil) 5 mg 24 hr tablet Take 2 tablets (10 mg) by mouth once daily. 9/6/22   Historical Provider, MD   fexofenadine HCl (ALLEGRA ORAL) Take 180 mg by mouth once daily.    Historical Provider, MD   fluticasone propion-salmeteroL (Wixela Inhub) 500-50 mcg/dose diskus inhaler Inhale 1 puff 2 times a day.    Historical Provider, MD   garlic tablet Take 1 tablet by mouth once daily.    Historical Provider, MD   insulin degludec (Tresiba FlexTouch U-100) 100 unit/mL (3 mL) injection Inject 40 Units under the skin once daily at bedtime. Take as directed per insulin instructions.    Historical Provider, MD   losartan-hydrochlorothiazide (Hyzaar) 100-25 mg tablet Take 1 tablet by mouth once daily. 1/11/23   Historical Provider, MD   metFORMIN (Glucophage) 500 mg tablet Take 2 tablets (1,000 mg) by mouth 2 times a day with meals.    Historical Provider, MD   nystatin-triamcinolone (Mycolog II) cream Apply 1 Application topically 2 times a day.    Historical Provider, MD   omega 3-dha-epa-fish oil-krill 700 mg-600 mg- 900 mg capsule Take 1 capsule by mouth once daily.    Historical Provider, MD   pregabalin (Lyrica) 75 mg capsule Take 1 capsule (75 mg) by mouth 3 times a day.    Historical Provider, MD   vitamin E acetate (VITAMIN E ORAL) Take 1 capsule by mouth once daily.    Historical Provider, MD   albuterol 2.5 mg /3 mL (0.083 %) nebulizer solution Inhale 3 mL (2.5 mg) every 6 hours if needed for shortness of breath or wheezing. Inhale by nebulizer over 5-15 minutes 3/23/20 12/14/23  Historical Provider, MD   pregabalin (Lyrica) 50 mg capsule Take 1 capsule (50 mg) by mouth 3 times a day. For 90 days 12/7/22 12/14/23  Historical ProviderMD GAURANG   Constitutional: Negative for fever, chills, or sweats   ENMT: Negative for nasal discharge, congestion, ear pain,  mouth pain, throat pain   Respiratory: Negative for cough, wheezing, shortness of breath   Cardiac: Negative for chest pain, dyspnea on exertion, palpitations   Gastrointestinal: Negative for nausea, vomiting, diarrhea, constipation, abdominal pain  Genitourinary: Negative for dysuria, flank pain, frequency, hematuria     Musculoskeletal: Positive for decreased ROM, pain, swelling, weakness in right knee     Neurological: Negative for dizziness, confusion, headache  Psychiatric: Negative for mood changes   Skin: Negative for itching, rash, ulcer    Hematologic/Lymph: Negative for bruising, easy bleeding  Allergic/Immunologic: Negative itching, sneezing, swelling      Physical Exam  HENT:      Head: Normocephalic.      Mouth/Throat:      Mouth: Mucous membranes are moist.   Eyes:      Extraocular Movements: Extraocular movements intact.   Cardiovascular:      Rate and Rhythm: Normal rate and regular rhythm.   Pulmonary:      Effort: Pulmonary effort is normal.      Breath sounds: Normal breath sounds.   Abdominal:      General: Abdomen is flat.      Palpations: Abdomen is soft.   Musculoskeletal:         General: Normal range of motion.      Cervical back: Normal range of motion.   Skin:     General: Skin is warm and dry.   Neurological:      General: No focal deficit present.      Mental Status: He is alert.   Psychiatric:         Mood and Affect: Mood normal.          PAT AIRWAY:   Airway:     Neck ROM::  Full   Broken teeth  normal      Anesthesia:  Patient denies any anesthesia complications.      Visit Vitals  /77   Pulse 66   Temp 36.1 °C (97 °F) (Temporal)   Resp 16       DASI Risk Score      Flowsheet Row Most Recent Value   DASI SCORE 28.7   METS Score (Will be calculated only when all the questions are answered) 6.3          Caprini DVT Assessment      Flowsheet Row Most Recent Value   DVT Score 15   Current Status Major surgery planned, including arthroscopic and laproscopic (1-2 hours), Swollen  legs, Elective major lower extremity arthroplasty   History Prior major surgery, COPD   Age 60-75 years   BMI 31-40 (Obesity)          Modified Frailty Index      Flowsheet Row Most Recent Value   Modified Frailty Index Calculator .2727          CHADS2 Stroke Risk  Current as of 6 minutes ago        N/A 3 - 100%: High Risk   2 - 3%: Medium Risk   0 - 2%: Low Risk     Last Change: N/A          This score determines the patient's risk of having a stroke if the patient has atrial fibrillation.        This score is not applicable to this patient. Components are not calculated.          Revised Cardiac Risk Index    No data to display       Apfel Simplified Score      Flowsheet Row Most Recent Value   Apfel Simplified Score Calculator 1          Risk Analysis Index Results This Encounter         12/14/2023  1318             GOODMAN Cancer History: Patient does not indicate history of cancer    Total Risk Analysis Index Score Without Cancer: 25    Total Risk Analysis Index Score: 25          Stop Bang Score      Flowsheet Row Most Recent Value   Do you snore loudly? 0   Do you often feel tired or fatigued after your sleep? 0   Has anyone ever observed you stop breathing in your sleep? 1   Do you have or are you being treated for high blood pressure? 1   Recent BMI (Calculated) 40.6   Is BMI greater than 35 kg/m2? 1=Yes   Age older than 50 years old? 1=Yes   Is your neck circumference greater than 17 inches (Male) or 16 inches (Female)? 1   Gender - Male 1=Yes   STOP-BANG Total Score 6            Assessment and Plan:     74 yo male scheduled for right total knee replacement on 1/3/2024 with Dr. Anaya. Blood work and MRSA ordered- oral chlorhexidine at home from previous prescription. EKG shows wide rhythm with left axis- pacemaker not being read but patient has ventricular pacemaker- comparable EKG from Oct 2023 on file.  Cardiac clearance from Dr. Olivia on file as well. Otherwise no further orders indicated. ASA3: A to  review    See risk scores as previously documented.

## 2023-12-16 LAB — STAPHYLOCOCCUS SPEC CULT: NORMAL

## 2024-01-03 ENCOUNTER — ANESTHESIA EVENT (OUTPATIENT)
Dept: OPERATING ROOM | Facility: HOSPITAL | Age: 74
End: 2024-01-03
Payer: MEDICARE

## 2024-01-03 ENCOUNTER — ANESTHESIA (OUTPATIENT)
Dept: OPERATING ROOM | Facility: HOSPITAL | Age: 74
End: 2024-01-03
Payer: MEDICARE

## 2024-01-03 ENCOUNTER — HOSPITAL ENCOUNTER (OUTPATIENT)
Facility: HOSPITAL | Age: 74
Discharge: HOME | End: 2024-01-04
Attending: ORTHOPAEDIC SURGERY | Admitting: ORTHOPAEDIC SURGERY
Payer: MEDICARE

## 2024-01-03 DIAGNOSIS — M17.11 OSTEOARTHRITIS OF RIGHT KNEE, UNSPECIFIED OSTEOARTHRITIS TYPE: Primary | ICD-10-CM

## 2024-01-03 LAB
ABO GROUP (TYPE) IN BLOOD: NORMAL
ANTIBODY SCREEN: NORMAL
GLUCOSE BLD MANUAL STRIP-MCNC: 154 MG/DL (ref 74–99)
GLUCOSE BLD MANUAL STRIP-MCNC: 265 MG/DL (ref 74–99)
GLUCOSE BLD MANUAL STRIP-MCNC: 303 MG/DL (ref 74–99)
GLUCOSE BLD MANUAL STRIP-MCNC: 96 MG/DL (ref 74–99)
RH FACTOR (ANTIGEN D): NORMAL

## 2024-01-03 PROCEDURE — 86901 BLOOD TYPING SEROLOGIC RH(D): CPT | Performed by: ORTHOPAEDIC SURGERY

## 2024-01-03 PROCEDURE — 2720000007 HC OR 272 NO HCPCS: Performed by: ORTHOPAEDIC SURGERY

## 2024-01-03 PROCEDURE — 36415 COLL VENOUS BLD VENIPUNCTURE: CPT | Performed by: ORTHOPAEDIC SURGERY

## 2024-01-03 PROCEDURE — 82947 ASSAY GLUCOSE BLOOD QUANT: CPT

## 2024-01-03 PROCEDURE — 2500000005 HC RX 250 GENERAL PHARMACY W/O HCPCS: Performed by: ANESTHESIOLOGIST ASSISTANT

## 2024-01-03 PROCEDURE — 3700000001 HC GENERAL ANESTHESIA TIME - INITIAL BASE CHARGE: Performed by: ORTHOPAEDIC SURGERY

## 2024-01-03 PROCEDURE — 2500000004 HC RX 250 GENERAL PHARMACY W/ HCPCS (ALT 636 FOR OP/ED): Performed by: ANESTHESIOLOGY

## 2024-01-03 PROCEDURE — 2500000004 HC RX 250 GENERAL PHARMACY W/ HCPCS (ALT 636 FOR OP/ED): Performed by: ANESTHESIOLOGIST ASSISTANT

## 2024-01-03 PROCEDURE — 7100000001 HC RECOVERY ROOM TIME - INITIAL BASE CHARGE: Performed by: ORTHOPAEDIC SURGERY

## 2024-01-03 PROCEDURE — 2500000004 HC RX 250 GENERAL PHARMACY W/ HCPCS (ALT 636 FOR OP/ED): Performed by: ORTHOPAEDIC SURGERY

## 2024-01-03 PROCEDURE — 2500000002 HC RX 250 W HCPCS SELF ADMINISTERED DRUGS (ALT 637 FOR MEDICARE OP, ALT 636 FOR OP/ED): Performed by: ORTHOPAEDIC SURGERY

## 2024-01-03 PROCEDURE — 2500000001 HC RX 250 WO HCPCS SELF ADMINISTERED DRUGS (ALT 637 FOR MEDICARE OP): Performed by: ORTHOPAEDIC SURGERY

## 2024-01-03 PROCEDURE — 2780000003 HC OR 278 NO HCPCS: Performed by: ORTHOPAEDIC SURGERY

## 2024-01-03 PROCEDURE — 3700000002 HC GENERAL ANESTHESIA TIME - EACH INCREMENTAL 1 MINUTE: Performed by: ORTHOPAEDIC SURGERY

## 2024-01-03 PROCEDURE — A27447 PR TOTAL KNEE ARTHROPLASTY: Performed by: ANESTHESIOLOGY

## 2024-01-03 PROCEDURE — 3600000017 HC OR TIME - EACH INCREMENTAL 1 MINUTE - PROCEDURE LEVEL SIX: Performed by: ORTHOPAEDIC SURGERY

## 2024-01-03 PROCEDURE — 7100000002 HC RECOVERY ROOM TIME - EACH INCREMENTAL 1 MINUTE: Performed by: ORTHOPAEDIC SURGERY

## 2024-01-03 PROCEDURE — 99222 1ST HOSP IP/OBS MODERATE 55: CPT | Performed by: INTERNAL MEDICINE

## 2024-01-03 PROCEDURE — 99100 ANES PT EXTEME AGE<1 YR&>70: CPT | Performed by: ANESTHESIOLOGY

## 2024-01-03 PROCEDURE — 2500000002 HC RX 250 W HCPCS SELF ADMINISTERED DRUGS (ALT 637 FOR MEDICARE OP, ALT 636 FOR OP/ED): Performed by: PHYSICIAN ASSISTANT

## 2024-01-03 PROCEDURE — 7100000011 HC EXTENDED STAY RECOVERY HOURLY - NURSING UNIT

## 2024-01-03 PROCEDURE — A27447 PR TOTAL KNEE ARTHROPLASTY: Performed by: ANESTHESIOLOGIST ASSISTANT

## 2024-01-03 PROCEDURE — C1776 JOINT DEVICE (IMPLANTABLE): HCPCS | Performed by: ORTHOPAEDIC SURGERY

## 2024-01-03 PROCEDURE — 64447 NJX AA&/STRD FEMORAL NRV IMG: CPT | Performed by: ANESTHESIOLOGY

## 2024-01-03 PROCEDURE — 97161 PT EVAL LOW COMPLEX 20 MIN: CPT | Mod: GP

## 2024-01-03 PROCEDURE — 97116 GAIT TRAINING THERAPY: CPT | Mod: GP

## 2024-01-03 PROCEDURE — 3600000018 HC OR TIME - INITIAL BASE CHARGE - PROCEDURE LEVEL SIX: Performed by: ORTHOPAEDIC SURGERY

## 2024-01-03 DEVICE — TIBIAL COMPONENT
Type: IMPLANTABLE DEVICE | Site: KNEE | Status: FUNCTIONAL
Brand: TRIATHLON

## 2024-01-03 DEVICE — PATELLA
Type: IMPLANTABLE DEVICE | Site: KNEE | Status: FUNCTIONAL
Brand: TRIATHLON

## 2024-01-03 DEVICE — TIBIAL BEARING INSERT - CR
Type: IMPLANTABLE DEVICE | Site: KNEE | Status: FUNCTIONAL
Brand: TRIATHLON

## 2024-01-03 DEVICE — CRUCIATE RETAINING FEMORAL
Type: IMPLANTABLE DEVICE | Site: KNEE | Status: FUNCTIONAL
Brand: TRIATHLON

## 2024-01-03 RX ORDER — NALOXONE HYDROCHLORIDE 0.4 MG/ML
0.2 INJECTION, SOLUTION INTRAMUSCULAR; INTRAVENOUS; SUBCUTANEOUS EVERY 5 MIN PRN
Status: DISCONTINUED | OUTPATIENT
Start: 2024-01-03 | End: 2024-01-04 | Stop reason: HOSPADM

## 2024-01-03 RX ORDER — PROPOFOL 10 MG/ML
INJECTION, EMULSION INTRAVENOUS CONTINUOUS PRN
Status: DISCONTINUED | OUTPATIENT
Start: 2024-01-03 | End: 2024-01-03

## 2024-01-03 RX ORDER — SODIUM CHLORIDE, SODIUM LACTATE, POTASSIUM CHLORIDE, CALCIUM CHLORIDE 600; 310; 30; 20 MG/100ML; MG/100ML; MG/100ML; MG/100ML
100 INJECTION, SOLUTION INTRAVENOUS CONTINUOUS
Status: CANCELLED | OUTPATIENT
Start: 2024-01-03

## 2024-01-03 RX ORDER — ACETAMINOPHEN 325 MG/1
650 TABLET ORAL EVERY 4 HOURS PRN
Status: DISCONTINUED | OUTPATIENT
Start: 2024-01-03 | End: 2024-01-03 | Stop reason: HOSPADM

## 2024-01-03 RX ORDER — HYDRALAZINE HYDROCHLORIDE 20 MG/ML
5 INJECTION INTRAMUSCULAR; INTRAVENOUS EVERY 30 MIN PRN
Status: CANCELLED | OUTPATIENT
Start: 2024-01-03

## 2024-01-03 RX ORDER — EZETIMIBE 10 MG/1
10 TABLET ORAL NIGHTLY
Status: DISCONTINUED | OUTPATIENT
Start: 2024-01-03 | End: 2024-01-04 | Stop reason: HOSPADM

## 2024-01-03 RX ORDER — SODIUM CHLORIDE 9 MG/ML
100 INJECTION, SOLUTION INTRAVENOUS CONTINUOUS
Status: ACTIVE | OUTPATIENT
Start: 2024-01-03 | End: 2024-01-04

## 2024-01-03 RX ORDER — OXYCODONE HYDROCHLORIDE 10 MG/1
10 TABLET ORAL EVERY 4 HOURS PRN
Status: DISCONTINUED | OUTPATIENT
Start: 2024-01-03 | End: 2024-01-03 | Stop reason: HOSPADM

## 2024-01-03 RX ORDER — METFORMIN HYDROCHLORIDE 1000 MG/1
1000 TABLET ORAL
Status: DISCONTINUED | OUTPATIENT
Start: 2024-01-03 | End: 2024-01-03

## 2024-01-03 RX ORDER — MIDAZOLAM HYDROCHLORIDE 1 MG/ML
1 INJECTION, SOLUTION INTRAMUSCULAR; INTRAVENOUS ONCE AS NEEDED
Status: CANCELLED | OUTPATIENT
Start: 2024-01-03

## 2024-01-03 RX ORDER — BUPIVACAINE HYDROCHLORIDE 7.5 MG/ML
INJECTION, SOLUTION INTRASPINAL AS NEEDED
Status: DISCONTINUED | OUTPATIENT
Start: 2024-01-03 | End: 2024-01-03

## 2024-01-03 RX ORDER — ONDANSETRON 4 MG/1
4 TABLET, ORALLY DISINTEGRATING ORAL EVERY 8 HOURS PRN
Status: DISCONTINUED | OUTPATIENT
Start: 2024-01-03 | End: 2024-01-04 | Stop reason: HOSPADM

## 2024-01-03 RX ORDER — MIDAZOLAM HYDROCHLORIDE 1 MG/ML
INJECTION, SOLUTION INTRAMUSCULAR; INTRAVENOUS AS NEEDED
Status: DISCONTINUED | OUTPATIENT
Start: 2024-01-03 | End: 2024-01-03

## 2024-01-03 RX ORDER — LOSARTAN POTASSIUM AND HYDROCHLOROTHIAZIDE 25; 100 MG/1; MG/1
1 TABLET ORAL DAILY
Status: DISCONTINUED | OUTPATIENT
Start: 2024-01-03 | End: 2024-01-03 | Stop reason: CLARIF

## 2024-01-03 RX ORDER — ALBUTEROL SULFATE 0.83 MG/ML
2.5 SOLUTION RESPIRATORY (INHALATION) ONCE AS NEEDED
Status: CANCELLED | OUTPATIENT
Start: 2024-01-03

## 2024-01-03 RX ORDER — ACETAMINOPHEN 325 MG/1
975 TABLET ORAL ONCE
Status: DISCONTINUED | OUTPATIENT
Start: 2024-01-03 | End: 2024-01-03 | Stop reason: HOSPADM

## 2024-01-03 RX ORDER — ASCORBIC ACID 500 MG
500 TABLET ORAL
Status: DISCONTINUED | OUTPATIENT
Start: 2024-01-03 | End: 2024-01-04 | Stop reason: HOSPADM

## 2024-01-03 RX ORDER — INSULIN GLARGINE 100 [IU]/ML
20 INJECTION, SOLUTION SUBCUTANEOUS ONCE
Status: COMPLETED | OUTPATIENT
Start: 2024-01-03 | End: 2024-01-03

## 2024-01-03 RX ORDER — BUDESONIDE 0.5 MG/2ML
0.5 INHALANT ORAL 2 TIMES DAILY PRN
Status: DISCONTINUED | OUTPATIENT
Start: 2024-01-03 | End: 2024-01-04 | Stop reason: HOSPADM

## 2024-01-03 RX ORDER — PHENYLEPHRINE 10 MG/250 ML(40 MCG/ML)IN 0.9 % SOD.CHLORIDE INTRAVENOUS
CONTINUOUS PRN
Status: DISCONTINUED | OUTPATIENT
Start: 2024-01-03 | End: 2024-01-03

## 2024-01-03 RX ORDER — ONDANSETRON HYDROCHLORIDE 2 MG/ML
4 INJECTION, SOLUTION INTRAVENOUS ONCE AS NEEDED
Status: CANCELLED | OUTPATIENT
Start: 2024-01-03

## 2024-01-03 RX ORDER — OXYCODONE HYDROCHLORIDE 5 MG/1
5 TABLET ORAL EVERY 6 HOURS PRN
Status: DISCONTINUED | OUTPATIENT
Start: 2024-01-03 | End: 2024-01-03

## 2024-01-03 RX ORDER — HYDROCODONE BITARTRATE AND ACETAMINOPHEN 5; 325 MG/1; MG/1
1 TABLET ORAL EVERY 4 HOURS PRN
Status: CANCELLED | OUTPATIENT
Start: 2024-01-03

## 2024-01-03 RX ORDER — ATENOLOL 50 MG/1
50 TABLET ORAL DAILY
Status: DISCONTINUED | OUTPATIENT
Start: 2024-01-04 | End: 2024-01-04 | Stop reason: HOSPADM

## 2024-01-03 RX ORDER — TRANEXAMIC ACID 650 MG/1
1300 TABLET ORAL ONCE
Status: COMPLETED | OUTPATIENT
Start: 2024-01-03 | End: 2024-01-03

## 2024-01-03 RX ORDER — ALBUTEROL SULFATE 0.83 MG/ML
2.5 SOLUTION RESPIRATORY (INHALATION) EVERY 8 HOURS PRN
Status: DISCONTINUED | OUTPATIENT
Start: 2024-01-03 | End: 2024-01-04 | Stop reason: HOSPADM

## 2024-01-03 RX ORDER — OXYCODONE HYDROCHLORIDE 10 MG/1
10 TABLET ORAL EVERY 4 HOURS PRN
Status: DISCONTINUED | OUTPATIENT
Start: 2024-01-03 | End: 2024-01-04 | Stop reason: HOSPADM

## 2024-01-03 RX ORDER — ACETAMINOPHEN 325 MG/1
650 TABLET ORAL EVERY 4 HOURS PRN
Status: CANCELLED | OUTPATIENT
Start: 2024-01-03

## 2024-01-03 RX ORDER — ACETAMINOPHEN 325 MG/1
650 TABLET ORAL EVERY 6 HOURS SCHEDULED
Status: DISCONTINUED | OUTPATIENT
Start: 2024-01-03 | End: 2024-01-04 | Stop reason: HOSPADM

## 2024-01-03 RX ORDER — PROPOFOL 10 MG/ML
INJECTION, EMULSION INTRAVENOUS AS NEEDED
Status: DISCONTINUED | OUTPATIENT
Start: 2024-01-03 | End: 2024-01-03

## 2024-01-03 RX ORDER — HYDROMORPHONE HYDROCHLORIDE 1 MG/ML
0.5 INJECTION, SOLUTION INTRAMUSCULAR; INTRAVENOUS; SUBCUTANEOUS EVERY 5 MIN PRN
Status: DISCONTINUED | OUTPATIENT
Start: 2024-01-03 | End: 2024-01-03 | Stop reason: HOSPADM

## 2024-01-03 RX ORDER — SODIUM CHLORIDE, SODIUM LACTATE, POTASSIUM CHLORIDE, CALCIUM CHLORIDE 600; 310; 30; 20 MG/100ML; MG/100ML; MG/100ML; MG/100ML
100 INJECTION, SOLUTION INTRAVENOUS CONTINUOUS
Status: DISCONTINUED | OUTPATIENT
Start: 2024-01-03 | End: 2024-01-03 | Stop reason: HOSPADM

## 2024-01-03 RX ORDER — DEXTROSE MONOHYDRATE 100 MG/ML
0.3 INJECTION, SOLUTION INTRAVENOUS ONCE AS NEEDED
Status: DISCONTINUED | OUTPATIENT
Start: 2024-01-03 | End: 2024-01-04 | Stop reason: HOSPADM

## 2024-01-03 RX ORDER — DIPHENHYDRAMINE HYDROCHLORIDE 50 MG/ML
12.5 INJECTION INTRAMUSCULAR; INTRAVENOUS EVERY 6 HOURS PRN
Status: DISCONTINUED | OUTPATIENT
Start: 2024-01-03 | End: 2024-01-04 | Stop reason: HOSPADM

## 2024-01-03 RX ORDER — ACETAMINOPHEN 325 MG/1
975 TABLET ORAL ONCE
Status: COMPLETED | OUTPATIENT
Start: 2024-01-03 | End: 2024-01-03

## 2024-01-03 RX ORDER — ONDANSETRON HYDROCHLORIDE 2 MG/ML
4 INJECTION, SOLUTION INTRAVENOUS EVERY 8 HOURS PRN
Status: DISCONTINUED | OUTPATIENT
Start: 2024-01-03 | End: 2024-01-04 | Stop reason: HOSPADM

## 2024-01-03 RX ORDER — FAMOTIDINE 20 MG/1
20 TABLET, FILM COATED ORAL DAILY
Status: DISCONTINUED | OUTPATIENT
Start: 2024-01-03 | End: 2024-01-04 | Stop reason: HOSPADM

## 2024-01-03 RX ORDER — HYDROMORPHONE HYDROCHLORIDE 1 MG/ML
1 INJECTION, SOLUTION INTRAMUSCULAR; INTRAVENOUS; SUBCUTANEOUS EVERY 5 MIN PRN
Status: DISCONTINUED | OUTPATIENT
Start: 2024-01-03 | End: 2024-01-03 | Stop reason: HOSPADM

## 2024-01-03 RX ORDER — ACETAMINOPHEN 325 MG/1
975 TABLET ORAL ONCE
Status: CANCELLED | OUTPATIENT
Start: 2024-01-03 | End: 2024-01-03

## 2024-01-03 RX ORDER — ONDANSETRON HYDROCHLORIDE 2 MG/ML
4 INJECTION, SOLUTION INTRAVENOUS ONCE AS NEEDED
Status: DISCONTINUED | OUTPATIENT
Start: 2024-01-03 | End: 2024-01-03 | Stop reason: HOSPADM

## 2024-01-03 RX ORDER — INSULIN DEGLUDEC 100 U/ML
40 INJECTION, SOLUTION SUBCUTANEOUS NIGHTLY
Status: DISCONTINUED | OUTPATIENT
Start: 2024-01-03 | End: 2024-01-03

## 2024-01-03 RX ORDER — HYDROMORPHONE HYDROCHLORIDE 1 MG/ML
1 INJECTION, SOLUTION INTRAMUSCULAR; INTRAVENOUS; SUBCUTANEOUS EVERY 5 MIN PRN
Status: CANCELLED | OUTPATIENT
Start: 2024-01-03

## 2024-01-03 RX ORDER — FENTANYL CITRATE 50 UG/ML
INJECTION, SOLUTION INTRAMUSCULAR; INTRAVENOUS AS NEEDED
Status: DISCONTINUED | OUTPATIENT
Start: 2024-01-03 | End: 2024-01-03

## 2024-01-03 RX ORDER — OXYCODONE HYDROCHLORIDE 10 MG/1
10 TABLET ORAL EVERY 4 HOURS PRN
Status: CANCELLED | OUTPATIENT
Start: 2024-01-03

## 2024-01-03 RX ORDER — HYDROMORPHONE HYDROCHLORIDE 1 MG/ML
0.5 INJECTION, SOLUTION INTRAMUSCULAR; INTRAVENOUS; SUBCUTANEOUS EVERY 5 MIN PRN
Status: CANCELLED | OUTPATIENT
Start: 2024-01-03

## 2024-01-03 RX ORDER — ASPIRIN 81 MG/1
81 TABLET ORAL 2 TIMES DAILY
Status: DISCONTINUED | OUTPATIENT
Start: 2024-01-04 | End: 2024-01-04 | Stop reason: HOSPADM

## 2024-01-03 RX ORDER — PHENYLEPHRINE HYDROCHLORIDE 10 MG/ML
INJECTION INTRAVENOUS AS NEEDED
Status: DISCONTINUED | OUTPATIENT
Start: 2024-01-03 | End: 2024-01-03

## 2024-01-03 RX ORDER — POLYETHYLENE GLYCOL 3350 17 G/17G
17 POWDER, FOR SOLUTION ORAL DAILY
Status: DISCONTINUED | OUTPATIENT
Start: 2024-01-03 | End: 2024-01-04 | Stop reason: HOSPADM

## 2024-01-03 RX ORDER — HYDROMORPHONE HYDROCHLORIDE 1 MG/ML
0.1 INJECTION, SOLUTION INTRAMUSCULAR; INTRAVENOUS; SUBCUTANEOUS EVERY 5 MIN PRN
Status: DISCONTINUED | OUTPATIENT
Start: 2024-01-03 | End: 2024-01-03 | Stop reason: HOSPADM

## 2024-01-03 RX ORDER — CELECOXIB 200 MG/1
200 CAPSULE ORAL ONCE
Status: COMPLETED | OUTPATIENT
Start: 2024-01-03 | End: 2024-01-03

## 2024-01-03 RX ORDER — MIDAZOLAM HYDROCHLORIDE 1 MG/ML
1 INJECTION, SOLUTION INTRAMUSCULAR; INTRAVENOUS ONCE AS NEEDED
Status: DISCONTINUED | OUTPATIENT
Start: 2024-01-03 | End: 2024-01-03 | Stop reason: HOSPADM

## 2024-01-03 RX ORDER — GABAPENTIN 300 MG/1
600 CAPSULE ORAL ONCE
Status: COMPLETED | OUTPATIENT
Start: 2024-01-03 | End: 2024-01-03

## 2024-01-03 RX ORDER — OXYCODONE HYDROCHLORIDE 5 MG/1
5 TABLET ORAL EVERY 4 HOURS PRN
Status: DISCONTINUED | OUTPATIENT
Start: 2024-01-03 | End: 2024-01-04 | Stop reason: HOSPADM

## 2024-01-03 RX ORDER — ALBUTEROL SULFATE 90 UG/1
2 AEROSOL, METERED RESPIRATORY (INHALATION) EVERY 4 HOURS PRN
Status: DISCONTINUED | OUTPATIENT
Start: 2024-01-03 | End: 2024-01-03 | Stop reason: SDUPTHER

## 2024-01-03 RX ORDER — FLUTICASONE PROPIONATE AND SALMETEROL 500; 50 UG/1; UG/1
1 POWDER RESPIRATORY (INHALATION) 2 TIMES DAILY PRN
Status: DISCONTINUED | OUTPATIENT
Start: 2024-01-03 | End: 2024-01-03 | Stop reason: CLARIF

## 2024-01-03 RX ORDER — FELODIPINE 5 MG/1
10 TABLET, EXTENDED RELEASE ORAL DAILY
Status: DISCONTINUED | OUTPATIENT
Start: 2024-01-03 | End: 2024-01-04 | Stop reason: HOSPADM

## 2024-01-03 RX ORDER — HYDRALAZINE HYDROCHLORIDE 20 MG/ML
5 INJECTION INTRAMUSCULAR; INTRAVENOUS EVERY 30 MIN PRN
Status: DISCONTINUED | OUTPATIENT
Start: 2024-01-03 | End: 2024-01-03 | Stop reason: HOSPADM

## 2024-01-03 RX ORDER — GLYCOPYRROLATE 0.2 MG/ML
INJECTION INTRAMUSCULAR; INTRAVENOUS AS NEEDED
Status: DISCONTINUED | OUTPATIENT
Start: 2024-01-03 | End: 2024-01-03

## 2024-01-03 RX ORDER — ONDANSETRON HYDROCHLORIDE 2 MG/ML
INJECTION, SOLUTION INTRAVENOUS AS NEEDED
Status: DISCONTINUED | OUTPATIENT
Start: 2024-01-03 | End: 2024-01-03

## 2024-01-03 RX ORDER — CEFAZOLIN SODIUM 2 G/100ML
2 INJECTION, SOLUTION INTRAVENOUS ONCE
Status: DISCONTINUED | OUTPATIENT
Start: 2024-01-03 | End: 2024-01-03 | Stop reason: HOSPADM

## 2024-01-03 RX ORDER — DEXAMETHASONE SODIUM PHOSPHATE 4 MG/ML
INJECTION, SOLUTION INTRA-ARTICULAR; INTRALESIONAL; INTRAMUSCULAR; INTRAVENOUS; SOFT TISSUE AS NEEDED
Status: DISCONTINUED | OUTPATIENT
Start: 2024-01-03 | End: 2024-01-03

## 2024-01-03 RX ORDER — ALBUTEROL SULFATE 0.83 MG/ML
2.5 SOLUTION RESPIRATORY (INHALATION) ONCE AS NEEDED
Status: DISCONTINUED | OUTPATIENT
Start: 2024-01-03 | End: 2024-01-03 | Stop reason: HOSPADM

## 2024-01-03 RX ORDER — HYDROMORPHONE HYDROCHLORIDE 1 MG/ML
0.1 INJECTION, SOLUTION INTRAMUSCULAR; INTRAVENOUS; SUBCUTANEOUS EVERY 5 MIN PRN
Status: CANCELLED | OUTPATIENT
Start: 2024-01-03

## 2024-01-03 RX ORDER — FORMOTEROL FUMARATE DIHYDRATE 20 UG/2ML
20 SOLUTION RESPIRATORY (INHALATION) 2 TIMES DAILY PRN
Status: DISCONTINUED | OUTPATIENT
Start: 2024-01-03 | End: 2024-01-04 | Stop reason: HOSPADM

## 2024-01-03 RX ORDER — HYDROCODONE BITARTRATE AND ACETAMINOPHEN 5; 325 MG/1; MG/1
1 TABLET ORAL EVERY 4 HOURS PRN
Status: DISCONTINUED | OUTPATIENT
Start: 2024-01-03 | End: 2024-01-03 | Stop reason: HOSPADM

## 2024-01-03 RX ORDER — DEXTROSE 50 % IN WATER (D50W) INTRAVENOUS SYRINGE
25
Status: DISCONTINUED | OUTPATIENT
Start: 2024-01-03 | End: 2024-01-04 | Stop reason: HOSPADM

## 2024-01-03 RX ADMIN — OXYCODONE HYDROCHLORIDE 10 MG: 10 TABLET ORAL at 15:24

## 2024-01-03 RX ADMIN — MIDAZOLAM 1 MG: 1 INJECTION INTRAMUSCULAR; INTRAVENOUS at 08:19

## 2024-01-03 RX ADMIN — ACETAMINOPHEN 650 MG: 325 TABLET ORAL at 17:26

## 2024-01-03 RX ADMIN — BUPIVACAINE HYDROCHLORIDE IN DEXTROSE 2 ML: 7.5 INJECTION, SOLUTION SUBARACHNOID at 08:35

## 2024-01-03 RX ADMIN — FENTANYL CITRATE 25 MCG: 50 INJECTION, SOLUTION INTRAMUSCULAR; INTRAVENOUS at 10:51

## 2024-01-03 RX ADMIN — TRANEXAMIC ACID 1300 MG: 650 TABLET ORAL at 07:04

## 2024-01-03 RX ADMIN — DEXTROSE MONOHYDRATE 3 G: 5 INJECTION INTRAVENOUS at 14:24

## 2024-01-03 RX ADMIN — LOSARTAN POTASSIUM: 100 TABLET, FILM COATED ORAL at 14:24

## 2024-01-03 RX ADMIN — PHENYLEPHRINE HYDROCHLORIDE 20 MCG/MIN: 10 INJECTION INTRAVENOUS at 08:45

## 2024-01-03 RX ADMIN — HYDROMORPHONE HYDROCHLORIDE 1 MG: 1 INJECTION, SOLUTION INTRAMUSCULAR; INTRAVENOUS; SUBCUTANEOUS at 11:38

## 2024-01-03 RX ADMIN — ACETAMINOPHEN 975 MG: 325 TABLET ORAL at 07:04

## 2024-01-03 RX ADMIN — EZETIMIBE 10 MG: 10 TABLET ORAL at 20:10

## 2024-01-03 RX ADMIN — GABAPENTIN 600 MG: 300 CAPSULE ORAL at 07:04

## 2024-01-03 RX ADMIN — DEXTROSE MONOHYDRATE 3 G: 5 INJECTION INTRAVENOUS at 22:50

## 2024-01-03 RX ADMIN — Medication 10 MG: at 08:52

## 2024-01-03 RX ADMIN — Medication 3 G: at 08:31

## 2024-01-03 RX ADMIN — SODIUM CHLORIDE, SODIUM LACTATE, POTASSIUM CHLORIDE, AND CALCIUM CHLORIDE: 600; 310; 30; 20 INJECTION, SOLUTION INTRAVENOUS at 06:58

## 2024-01-03 RX ADMIN — MIDAZOLAM 1 MG: 1 INJECTION INTRAMUSCULAR; INTRAVENOUS at 08:13

## 2024-01-03 RX ADMIN — GLYCOPYRROLATE 0.2 MG: 0.2 INJECTION, SOLUTION INTRAMUSCULAR; INTRAVENOUS at 08:23

## 2024-01-03 RX ADMIN — PHENYLEPHRINE HYDROCHLORIDE 100 MCG: 10 INJECTION INTRAVENOUS at 09:33

## 2024-01-03 RX ADMIN — PROPOFOL 100 MCG/KG/MIN: 10 INJECTION, EMULSION INTRAVENOUS at 08:41

## 2024-01-03 RX ADMIN — OXYCODONE HYDROCHLORIDE 10 MG: 10 TABLET ORAL at 20:10

## 2024-01-03 RX ADMIN — FENTANYL CITRATE 25 MCG: 50 INJECTION, SOLUTION INTRAMUSCULAR; INTRAVENOUS at 09:57

## 2024-01-03 RX ADMIN — DEXAMETHASONE SODIUM PHOSPHATE 4 MG: 4 INJECTION INTRA-ARTICULAR; INTRALESIONAL; INTRAMUSCULAR; INTRAVENOUS; SOFT TISSUE at 09:50

## 2024-01-03 RX ADMIN — ONDANSETRON 4 MG: 2 INJECTION INTRAMUSCULAR; INTRAVENOUS at 10:14

## 2024-01-03 RX ADMIN — Medication 5 MG: at 09:51

## 2024-01-03 RX ADMIN — SODIUM CHLORIDE, SODIUM LACTATE, POTASSIUM CHLORIDE, AND CALCIUM CHLORIDE: 600; 310; 30; 20 INJECTION, SOLUTION INTRAVENOUS at 10:14

## 2024-01-03 RX ADMIN — CELECOXIB 200 MG: 200 CAPSULE ORAL at 07:04

## 2024-01-03 RX ADMIN — PROPOFOL 20 MG: 10 INJECTION, EMULSION INTRAVENOUS at 10:35

## 2024-01-03 RX ADMIN — FENTANYL CITRATE 25 MCG: 50 INJECTION, SOLUTION INTRAMUSCULAR; INTRAVENOUS at 10:17

## 2024-01-03 RX ADMIN — INSULIN HUMAN 3 UNITS: 100 INJECTION, SOLUTION PARENTERAL at 17:26

## 2024-01-03 RX ADMIN — SODIUM CHLORIDE 100 ML/HR: 9 INJECTION, SOLUTION INTRAVENOUS at 14:00

## 2024-01-03 RX ADMIN — FENTANYL CITRATE 25 MCG: 50 INJECTION, SOLUTION INTRAMUSCULAR; INTRAVENOUS at 08:19

## 2024-01-03 RX ADMIN — INSULIN GLARGINE 20 UNITS: 100 INJECTION, SOLUTION SUBCUTANEOUS at 20:00

## 2024-01-03 SDOH — SOCIAL STABILITY: SOCIAL INSECURITY: HAVE YOU HAD THOUGHTS OF HARMING ANYONE ELSE?: NO

## 2024-01-03 SDOH — ECONOMIC STABILITY: INCOME INSECURITY: IN THE PAST 12 MONTHS, HAS THE ELECTRIC, GAS, OIL, OR WATER COMPANY THREATENED TO SHUT OFF SERVICE IN YOUR HOME?: NO

## 2024-01-03 SDOH — HEALTH STABILITY: MENTAL HEALTH: HOW OFTEN DO YOU HAVE A DRINK CONTAINING ALCOHOL?: NEVER

## 2024-01-03 SDOH — SOCIAL STABILITY: SOCIAL INSECURITY: ARE YOU OR HAVE YOU BEEN THREATENED OR ABUSED PHYSICALLY, EMOTIONALLY, OR SEXUALLY BY ANYONE?: NO

## 2024-01-03 SDOH — SOCIAL STABILITY: SOCIAL INSECURITY: ABUSE: ADULT

## 2024-01-03 SDOH — SOCIAL STABILITY: SOCIAL INSECURITY: WERE YOU ABLE TO COMPLETE ALL THE BEHAVIORAL HEALTH SCREENINGS?: YES

## 2024-01-03 SDOH — HEALTH STABILITY: MENTAL HEALTH: HOW MANY STANDARD DRINKS CONTAINING ALCOHOL DO YOU HAVE ON A TYPICAL DAY?: PATIENT DOES NOT DRINK

## 2024-01-03 SDOH — ECONOMIC STABILITY: HOUSING INSECURITY
IN THE LAST 12 MONTHS, WAS THERE A TIME WHEN YOU DID NOT HAVE A STEADY PLACE TO SLEEP OR SLEPT IN A SHELTER (INCLUDING NOW)?: NO

## 2024-01-03 SDOH — SOCIAL STABILITY: SOCIAL INSECURITY: ARE THERE ANY APPARENT SIGNS OF INJURIES/BEHAVIORS THAT COULD BE RELATED TO ABUSE/NEGLECT?: NO

## 2024-01-03 SDOH — ECONOMIC STABILITY: INCOME INSECURITY: HOW HARD IS IT FOR YOU TO PAY FOR THE VERY BASICS LIKE FOOD, HOUSING, MEDICAL CARE, AND HEATING?: NOT HARD AT ALL

## 2024-01-03 SDOH — SOCIAL STABILITY: SOCIAL INSECURITY: DO YOU FEEL UNSAFE GOING BACK TO THE PLACE WHERE YOU ARE LIVING?: NO

## 2024-01-03 SDOH — HEALTH STABILITY: MENTAL HEALTH: HOW OFTEN DO YOU HAVE 6 OR MORE DRINKS ON ONE OCCASION?: NEVER

## 2024-01-03 SDOH — ECONOMIC STABILITY: INCOME INSECURITY: IN THE LAST 12 MONTHS, WAS THERE A TIME WHEN YOU WERE NOT ABLE TO PAY THE MORTGAGE OR RENT ON TIME?: NO

## 2024-01-03 SDOH — HEALTH STABILITY: MENTAL HEALTH: CURRENT SMOKER: 0

## 2024-01-03 SDOH — ECONOMIC STABILITY: TRANSPORTATION INSECURITY
IN THE PAST 12 MONTHS, HAS THE LACK OF TRANSPORTATION KEPT YOU FROM MEDICAL APPOINTMENTS OR FROM GETTING MEDICATIONS?: NO

## 2024-01-03 SDOH — ECONOMIC STABILITY: TRANSPORTATION INSECURITY
IN THE PAST 12 MONTHS, HAS LACK OF TRANSPORTATION KEPT YOU FROM MEETINGS, WORK, OR FROM GETTING THINGS NEEDED FOR DAILY LIVING?: NO

## 2024-01-03 SDOH — SOCIAL STABILITY: SOCIAL INSECURITY: HAS ANYONE EVER THREATENED TO HURT YOUR FAMILY OR YOUR PETS?: NO

## 2024-01-03 SDOH — SOCIAL STABILITY: SOCIAL INSECURITY: DOES ANYONE TRY TO KEEP YOU FROM HAVING/CONTACTING OTHER FRIENDS OR DOING THINGS OUTSIDE YOUR HOME?: NO

## 2024-01-03 SDOH — SOCIAL STABILITY: SOCIAL INSECURITY: DO YOU FEEL ANYONE HAS EXPLOITED OR TAKEN ADVANTAGE OF YOU FINANCIALLY OR OF YOUR PERSONAL PROPERTY?: NO

## 2024-01-03 ASSESSMENT — LIFESTYLE VARIABLES
SKIP TO QUESTIONS 9-10: 1
AUDIT-C TOTAL SCORE: 0
AUDIT-C TOTAL SCORE: 0
HOW MANY STANDARD DRINKS CONTAINING ALCOHOL DO YOU HAVE ON A TYPICAL DAY: PATIENT DOES NOT DRINK
SKIP TO QUESTIONS 9-10: 1
SKIP TO QUESTIONS 9-10: 1
HOW OFTEN DO YOU HAVE 6 OR MORE DRINKS ON ONE OCCASION: NEVER
AUDIT-C TOTAL SCORE: 0
AUDIT-C TOTAL SCORE: 0
HOW OFTEN DO YOU HAVE A DRINK CONTAINING ALCOHOL: NEVER

## 2024-01-03 ASSESSMENT — COGNITIVE AND FUNCTIONAL STATUS - GENERAL
STANDING UP FROM CHAIR USING ARMS: A LOT
PERSONAL GROOMING: A LITTLE
TURNING FROM BACK TO SIDE WHILE IN FLAT BAD: A LOT
DRESSING REGULAR UPPER BODY CLOTHING: A LITTLE
PATIENT BASELINE BEDBOUND: NO
CLIMB 3 TO 5 STEPS WITH RAILING: A LOT
CLIMB 3 TO 5 STEPS WITH RAILING: A LOT
STANDING UP FROM CHAIR USING ARMS: A LITTLE
MOBILITY SCORE: 13
TURNING FROM BACK TO SIDE WHILE IN FLAT BAD: A LITTLE
WALKING IN HOSPITAL ROOM: A LITTLE
MOVING TO AND FROM BED TO CHAIR: A LOT
MOBILITY SCORE: 17
MOVING FROM LYING ON BACK TO SITTING ON SIDE OF FLAT BED WITH BEDRAILS: A LITTLE
DAILY ACTIVITIY SCORE: 17
DRESSING REGULAR LOWER BODY CLOTHING: A LOT
TOILETING: A LOT
MOVING TO AND FROM BED TO CHAIR: A LITTLE
MOVING FROM LYING ON BACK TO SITTING ON SIDE OF FLAT BED WITH BEDRAILS: A LITTLE
HELP NEEDED FOR BATHING: A LITTLE
WALKING IN HOSPITAL ROOM: A LOT

## 2024-01-03 ASSESSMENT — PAIN - FUNCTIONAL ASSESSMENT
PAIN_FUNCTIONAL_ASSESSMENT: 0-10

## 2024-01-03 ASSESSMENT — PAIN SCALES - GENERAL
PAINLEVEL_OUTOF10: 7
PAINLEVEL_OUTOF10: 0 - NO PAIN
PAINLEVEL_OUTOF10: 7
PAINLEVEL_OUTOF10: 3
PAINLEVEL_OUTOF10: 7
PAINLEVEL_OUTOF10: 10 - WORST POSSIBLE PAIN
PAINLEVEL_OUTOF10: 10 - WORST POSSIBLE PAIN
PAINLEVEL_OUTOF10: 7
PAINLEVEL_OUTOF10: 10 - WORST POSSIBLE PAIN
PAINLEVEL_OUTOF10: 7

## 2024-01-03 ASSESSMENT — PATIENT HEALTH QUESTIONNAIRE - PHQ9
SUM OF ALL RESPONSES TO PHQ9 QUESTIONS 1 & 2: 0
1. LITTLE INTEREST OR PLEASURE IN DOING THINGS: NOT AT ALL
2. FEELING DOWN, DEPRESSED OR HOPELESS: NOT AT ALL

## 2024-01-03 ASSESSMENT — ACTIVITIES OF DAILY LIVING (ADL)
ADEQUATE_TO_COMPLETE_ADL: YES
LACK_OF_TRANSPORTATION: NO
JUDGMENT_ADEQUATE_SAFELY_COMPLETE_DAILY_ACTIVITIES: YES
DRESSING YOURSELF: INDEPENDENT
TOILETING: NEEDS ASSISTANCE
BATHING: INDEPENDENT
PATIENT'S MEMORY ADEQUATE TO SAFELY COMPLETE DAILY ACTIVITIES?: YES
ASSISTIVE_DEVICE: WALKER
FEEDING YOURSELF: INDEPENDENT
HEARING - RIGHT EAR: FUNCTIONAL
WALKS IN HOME: INDEPENDENT
HEARING - LEFT EAR: FUNCTIONAL
GROOMING: INDEPENDENT

## 2024-01-03 ASSESSMENT — PAIN DESCRIPTION - LOCATION
LOCATION: KNEE
LOCATION: KNEE

## 2024-01-03 ASSESSMENT — COLUMBIA-SUICIDE SEVERITY RATING SCALE - C-SSRS
2. HAVE YOU ACTUALLY HAD ANY THOUGHTS OF KILLING YOURSELF?: NO
6. HAVE YOU EVER DONE ANYTHING, STARTED TO DO ANYTHING, OR PREPARED TO DO ANYTHING TO END YOUR LIFE?: NO
1. IN THE PAST MONTH, HAVE YOU WISHED YOU WERE DEAD OR WISHED YOU COULD GO TO SLEEP AND NOT WAKE UP?: NO

## 2024-01-03 ASSESSMENT — PAIN DESCRIPTION - DESCRIPTORS
DESCRIPTORS: ACHING;THROBBING
DESCRIPTORS: ACHING;THROBBING
DESCRIPTORS: ACHING

## 2024-01-03 ASSESSMENT — PAIN SCALES - WONG BAKER: WONGBAKER_NUMERICALRESPONSE: HURTS LITTLE MORE

## 2024-01-03 ASSESSMENT — PAIN SCALES - PAIN ASSESSMENT IN ADVANCED DEMENTIA (PAINAD)
FACIALEXPRESSION: SMILING OR INEXPRESSIVE
BODYLANGUAGE: RELAXED
CONSOLABILITY: NO NEED TO CONSOLE
TOTALSCORE: 0
BREATHING: NORMAL

## 2024-01-03 ASSESSMENT — PAIN DESCRIPTION - ORIENTATION
ORIENTATION: RIGHT
ORIENTATION: RIGHT

## 2024-01-03 NOTE — OP NOTE
RIGHT TOTAL KNEE REPLACEMENT (R) Operative Note     Date: 1/3/2024  OR Location: [facility] OR    Name: Franklin Ronquillo, : 1950, Age: 73 y.o., MRN: [id number], Sex: male    Diagnosis  Pre-op Diagnosis     * Unilateral primary osteoarthritis, right knee [M17.11] Post-op Diagnosis     * Unilateral primary osteoarthritis, right knee [M17.11]     Procedures  RIGHT TOTAL KNEE REPLACEMENT  31159 - MS ARTHRP KNE CONDYLE&PLATU MEDIAL&LAT COMPARTMENTS      Surgeons      * Sen Anaya - Primary    Resident/Fellow/Other Assistant:  Surgeon(s) and Role:    Procedure Summary  Anesthesia: General  ASA: ASA status not filed in the log.  Anesthesia Staff: Anesthesiologist: Bobbi Villela MD  C-AA: STEPHANIE Woodall  TEDDY: Darion Ruelas  Estimated Blood Loss: Less than 100 mL  Intra-op Medications: * No intraprocedure medications in log *           Anesthesia Record               Intraprocedure I/O Totals          Intake    Propofol Drip 0.00 mL    The total shown is the total volume documented since Anesthesia Start was filed.    Phenylephrine Drip 0.00 mL    The total shown is the total volume documented since Anesthesia Start was filed.    Total Intake 0 mL          Specimen: No specimens collected     Staff:   Circulator: Jalyn Veloz RN  Scrub Person: Cliff Moon; Amilcar Morse         Drains and/or Catheters: * None in log *    Tourniquet Times:     Total Tourniquet Time Documented:  Thigh (Right) - 30 minutes  Total: Thigh (Right) - 30 minutes      Implants:  Implants       Type Name Action Serial No.      Joint PATELLA, TRIATHLON, ASYMMETRIC,  SIZE A38 - [id number] Implanted      Joint INSERT, TIBIAL, TRIATHLON CR X3, SZ-5, 9MM - [id number] Implanted      Joint BASEPLATE, TRIATHLON TRITANIUM, SIZE 5, 49MM - [id number] Implanted      Joint COMPONENT, CR FEMORAL, P5, BEADED W/PA, RIGHT - [id number] Implanted               Findings: Severe medial compartment osteoarthritis    Indications: Franklin Ronquillo is an 73  y.o. male who is having surgery for M17.11 - UNILATERAL PRIMARY OSTEOARTHRITIS, RIGHT KNEE.     The patient was seen in the preoperative area. The risks, benefits, complications, treatment options, non-operative alternatives, expected recovery and outcomes were discussed with the patient. The possibilities of reaction to medication, pulmonary aspiration, injury to surrounding structures, bleeding, recurrent infection, the need for additional procedures, failure to diagnose a condition, and creating a complication requiring transfusion or operation were discussed with the patient. The patient concurred with the proposed plan, giving informed consent.  The site of surgery was properly noted/marked if necessary per policy. The patient has been actively warmed in preoperative area. Preoperative antibiotics have been ordered and given within 1 hours of incision. Venous thrombosis prophylaxis have been ordered including unilateral sequential compression device    Procedure Details: The patient was brought to the operating suite identified and induced into successful spinal anaesthesia. Next the right lower extremity was prepped and draped in the usual sterile fashion distal to a well-padded tourniquet. The limb was then exsanguinated of blood using gravity, and the tourniquet was then inflated to 275 mmhg. A timeout was then performed.  Incision was made on the anterior aspect of the knee, midline dissection was carried out through the subcutaneous tissue until the extensor mechanism was then encountered. A medial parapatellar arthrotomy was performed using the Bovie cautery, the patella was carefully inverted revealing the following arthritic findings: Severe osteoarthritis medial compartment complete loss of articular cartilage significant erosions into tibial plateau, advanced arthritis in osteophyte formation around patella. Total knee replacement then began by making a drill hole in the intracondylar notch, the  intramedullary heath with a distal femoral cutting guide was set at 4 degrees of valgus. Distal femur was then cut using an oscillating saw. The rotational guide was then inserted and the distal femur was sized to a number 5. The anterior posterior and chamfer cuts were then made using an oscillating saw.  The anterior cruciate ligament was removed. The PCL was  protected a medial release was performed and the proximal tibial subluxed anteriorally. The proximal tibial cut was then made using an oscillating saw, neutral medial to lateral with a slight posterior slope. The undersurface of the patella was also removed using oscillating saw. The following implants were then used to trial reduced the knee: 5 femoral component, 5 tibial component,38 mm patella, 9mm liner trial  The knee was then found to be very balanced in all ranges and for this reason the surfaces of bone were prepared by drilling  holes in the distal femur and the undersurface of the patella, A V keel slot and 4  holes were drilled in the proximal tibia.   The tourniquet was then released at this time and all obvious bleeding points were carefully coagulated. The  implants were inserted in the following order : A 5 triathlon Tritanium Tibia baseplate, 9 mm trial insert, 5 triathlon Press Fit Cruciate Retaining femoral component, and the 38 mm triathlon Tritanium metal backed patellar component. Once all implants were confirmed to be bone to bone the permanent 9 mm Traithlon CR polyethylene insert was then secured to the tibial baseplate, making sure there was no soft tissue impingement.  The medial parapatellar incision was closed using figure-of-eight absorbable sutures, and running barbed #1 monofilament suture. The subcutaneous tissue was closed with interrupted and running absorbable suture, and a glued mesh dressing was then used to seal the incision. Sterile dressing applied the patient was then aroused from anesthesia and transferred to  the postanesthesia care unit in stable condition recover fully from this anesthesia. All instrument and needle counts were correct.  The first assistant, physician assistant, was present for the entire operation and was a key portion of the surgical team, being responsible for aiding in positioning exposure vital organ protection and closure.  Complications:  None; patient tolerated the procedure well.    Disposition: PACU - hemodynamically stable.  Condition: stable         Additional Details: Tourniquet time 29 minutes    Attending Attestation:     Sne Anaya  Phone Number: [phone]

## 2024-01-03 NOTE — ANESTHESIA PROCEDURE NOTES
Spinal Block    Start time: 1/3/2024 8:18 AM  End time: 1/3/2024 8:25 AM  Reason for block: primary anesthetic  Staffing  Performed: STEPHANIE and attending   Authorized by: Bobbi Villela MD    Performed by: STEPHANIE Woodall    Preanesthetic Checklist  Completed: patient identified, IV checked, risks and benefits discussed, surgical consent, pre-op evaluation, timeout performed and sterile techniques followed  Block Timeout  RN/Licensed healthcare professional reads aloud to the Anesthesia provider and entire team: Patient identity, procedure with side and site, patient position, and as applicable the availability of implants/special equipment/special requirements.    Timeout performed at:   Spinal Block  Patient position: sitting  Prep: ChloraPrep  Sterility prep: drape, gloves, hand hygiene and mask  Sedation level: light sedation  Patient monitoring: blood pressure and continuous pulse oximetry  Approach: midline  Vertebral space: L3-4  Needle  Needle type: pencil-point   Needle gauge: 22 G  Needle length: 3.5 in  Free flowing CSF: yes    Assessment  Block outcome: patient comfortable  Procedure assessment: patient tolerated procedure well with no immediate complications

## 2024-01-03 NOTE — CONSULTS
Consults    Reason For Consult  Medical management of COPD and DM2    History Of Present Illness  Franklin Ronquillo is a 73 y.o. male presenting for an elective TKR.    This is a 73 year old man with known COPD, DM2, HTN, HPL insomnia, CHB s/p PPM who presented for an elective TKR that was uneventful from record review.  Medicine is consulted for medical management of chronic issues including COPD and well-controlled type II DM that are followed closely outpatient; patient had a medical optimization with his PCP at [facility], Dr. Leung, on 12/14/23.  Patient states he feels well, he has been up to use the restroom, pain is less than when he had his Left TKR.  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    He denies headache, vision change, chest pain, SOB, nausea, vomiting, diarrhea, rash; remainder of 12 point ROS is negative except as noted above.    Past Medical History  He has a past medical history of Arthritis, Asthma, CKD (chronic kidney disease), COPD (chronic obstructive pulmonary disease) (CMS/HCC), Diabetes mellitus (CMS/HCC), Hyperlipidemia, Hypertension, Intermittent complete heart block (CMS/HCC), Lipodermatosclerosis, Lumbar disc disease, Lumbar spinal stenosis, Pacemaker, PVD (peripheral vascular disease) (CMS/HCC), Sinus node dysfunction (CMS/HCC), and Sleep apnea.    Surgical History  He has a past surgical history that includes Joint replacement; Carpal tunnel release (Bilateral); Insert / replace / remove pacemaker; Spine surgery; Elbow surgery; and Colonoscopy.     Social History  He reports that he quit smoking about 42 years ago. His smoking use included cigarettes. He has never used smokeless tobacco. He reports that he does not currently use drugs. He reports that he does not drink alcohol.    Family History  Family History   Problem Relation Name Age of Onset    Cancer Mother       Coronary artery disease Father      Cancer Father      Diabetes Father          Allergies  Patient has no known allergies.    Review of Systems     Physical Exam  Vitals reviewed.   Constitutional:       General: He is not in acute distress.     Appearance: Normal appearance. He is not ill-appearing.   HENT:      Head: Normocephalic and atraumatic.      Nose: Nose normal.      Mouth/Throat:      Mouth: Mucous membranes are moist.   Eyes:      General: No scleral icterus.  Cardiovascular:      Rate and Rhythm: Normal rate and regular rhythm.      Pulses: Normal pulses.      Heart sounds: Normal heart sounds. No murmur heard.     No friction rub. No gallop.      Comments: PPM in place in Left chest  Pulmonary:      Effort: Pulmonary effort is normal. No respiratory distress.      Breath sounds: Normal breath sounds. No wheezing, rhonchi or rales.   Abdominal:      General: Abdomen is flat. Bowel sounds are normal. There is no distension.      Palpations: Abdomen is soft. There is no mass.      Tenderness: There is no abdominal tenderness. There is no guarding or rebound.   Musculoskeletal:         General: No swelling, tenderness or signs of injury.      Right lower leg: No edema.      Left lower leg: No edema.   Skin:     General: Skin is warm and dry.      Coloration: Skin is not jaundiced or pale.      Findings: No bruising, erythema, lesion or rash.      Comments: Left tkr scar, Right leg in wrap   Neurological:      Mental Status: He is alert and oriented to person, place, and time. Mental status is at baseline.      Cranial Nerves: No cranial nerve deficit.      Motor: No weakness.   Psychiatric:         Mood and Affect: Mood normal.         Behavior: Behavior normal.         Thought Content: Thought content normal.         Judgment: Judgment normal.          Last Recorded Vitals  /73 (BP Location: Right arm, Patient Position: Lying)   Pulse 60   Temp 36.1 °C (97 °F) (Temporal)   Resp 16   Wt 135 kg  (297 lb)   SpO2 98%     Relevant Results  Scheduled medications  acetaminophen, 650 mg, oral, q6h GABBIE  ascorbic acid, 500 mg, oral, Daily  [START ON 1/4/2024] aspirin, 81 mg, oral, BID  [START ON 1/4/2024] atenolol, 50 mg, oral, Daily  ceFAZolin, 3 g, intravenous, q8h  ezetimibe, 10 mg, oral, Nightly  famotidine, 20 mg, oral, Daily  felodipine ER, 10 mg, oral, Daily  insulin glargine, 20 Units, subcutaneous, Once  insulin regular, 0-5 Units, subcutaneous, TID with meals  losartan 100 mg, hydroCHLOROthiazide 25 mg for Hyzaar 100/25, , oral, Daily  polyethylene glycol, 17 g, oral, Daily      Continuous medications  oxygen, 2 L/min, Last Rate: Stopped (01/03/24 1300)  sodium chloride 0.9%, 100 mL/hr, Last Rate: 100 mL/hr (01/03/24 1759)      PRN medications  PRN medications: albuterol, budesonide, dextrose 10 % in water (D10W), dextrose, diphenhydrAMINE, formoterol, glucagon, HYDROmorphone, naloxone, ondansetron ODT **OR** ondansetron, oxyCODONE, oxyCODONE, oxygen  Results for orders placed or performed during the hospital encounter of 01/03/24 (from the past 24 hour(s))   POCT GLUCOSE   Result Value Ref Range    POCT Glucose 96 74 - 99 mg/dL   Type And Screen   Result Value Ref Range    ABO TYPE O     Rh TYPE POS     ANTIBODY SCREEN NEG    POCT GLUCOSE   Result Value Ref Range    POCT Glucose 154 (H) 74 - 99 mg/dL   POCT GLUCOSE   Result Value Ref Range    POCT Glucose 265 (H) 74 - 99 mg/dL     ECG 12 Lead    Result Date: 12/14/2023  Wide QRS rhythm Left axis deviation Right bundle branch block Possible Lateral infarct , age undetermined Inferior infarct , age undetermined Abnormal ECG When compared with ECG of 24-OCT-2023 13:30, Wide QRS rhythm has replaced Sinus rhythm        Assessment/Plan     This is a 73 year old man with known COPD, DM2, HTN, HPL insomnia, CHB s/p PPM who presented for an elective TKR that was uneventful from record review.  Medicine is consulted for medical management of chronic issues  including COPD and well-controlled type II DM that are followed closely outpatient; patient had a medical optimization with his PCP at [facility], Dr. Leung, on 12/14/23.    -Home medication reconciliation has been reviewed  -Obtain routine labs  -analgesia and vte ppx per primary team  -chronic medical issues are well controlled  -agree with long-acting insulin, ISS and hypoglycemic precautions ordered  -Medicine team will sign off, please feel free to call with any further specific questions regarding management of these chronic diseases followed by patient's PCP    Yrn Gill MD

## 2024-01-03 NOTE — NURSING NOTE
SBA with PT with walker and gait belt to BR to void; voided lg am urine; taking diet without N/V; assisted to chair and tolerated well; med for pain as requested

## 2024-01-03 NOTE — PROGRESS NOTES
Physical Therapy    Physical Therapy Evaluation    Patient Name: Franklin Ronquillo  MRN: [id number]  Today's Date: 1/3/2024   Time Calculation  Start Time: 1614  Stop Time: 1630  Time Calculation (min): 16 min    Assessment/Plan   PT Assessment  PT Assessment Results: Decreased strength, Decreased range of motion, Decreased endurance, Impaired balance, Decreased mobility, Pain, Orthopedic restrictions  Rehab Prognosis: Good  Evaluation/Treatment Tolerance: Patient tolerated treatment well  Medical Staff Made Aware: Yes  End of Session Communication: Bedside nurse  Assessment Comment: Pt presents today below baseline level of function and requires continued PT during hospital stay. Pt requires PT at a low intensity level at discharge to maximize functional mobility and safety.    End of Session Patient Position: Up in chair, Alarm on  IP OR SWING BED PT PLAN  Inpatient or Swing Bed: Inpatient  PT Plan  Treatment/Interventions: Bed mobility, Transfer training, Gait training, Balance training, Neuromuscular re-education, Strengthening, Endurance training, Range of motion, Therapeutic exercise, Therapeutic activity, Home exercise program, Stair training  PT Plan: Skilled PT  PT Frequency: BID  PT Discharge Recommendations: Low intensity level of continued care  PT Recommended Transfer Status: Assist x1 (FWW, gait belt)  PT - OK to Discharge: Yes (To next level of care when cleared by medical team   )    Subjective       General Visit Information:  General  Reason for Referral: TKA  Referred By: Sen Anaya MD  Past Medical History Relevant to Rehab: Pt admitted 1/3/24 following right TKA. PMH: CKD, COPD PVD, T2DM, PM  Prior to Session Communication: Bedside nurse  Patient Position Received:  (standing in room with walker with RN)  Preferred Learning Style: verbal  General Comment: Pt agreeable to PT, nursing cleared for treatment.    Home Living:  Home Living  Home Living Comments: Lives in house with first floor bedroom  and tub shower with SC and GB's. 3 + 1 KRISSY with 2 railings. Owns FWW and cane. Spouse and son can assist at discharge.    Prior Level of Function:  Prior Function Per Pt/Caregiver Report  Prior Function Comments: I with ambulation, I with ADLs except spouse assists with socks and shoes. Spouse completes IADLs. Denies falls in the past 6 months.    Precautions:  Precautions  LE Weight Bearing Status: Weight Bearing as Tolerated (right LE)  Medical Precautions: Fall precautions  Post-Surgical Precautions: Right total knee precautions    Vital Signs:     Objective     Pain:  Pain Assessment  Pain Assessment: 0-10  Pain Score: 7  Pain Type: Surgical pain  Pain Location: Knee  Pain Orientation: Right    Cognition:  Cognition  Overall Cognitive Status: Within Functional Limits    General Assessments:      Activity Tolerance  Endurance: Endurance does not limit participation in activity  Sensation  Sensation Comment: denies numbness and tingling              Static Sitting Balance  Static Sitting-Comment/Number of Minutes: good  Dynamic Sitting Balance  Dynamic Sitting-Comments: good  Static Standing Balance  Static Standing-Comment/Number of Minutes: fair  Dynamic Standing Balance  Dynamic Standing-Comments: fair    Functional Assessments:     Bed Mobility  Bed Mobility: No  Transfers  Transfer: Yes  Transfers 2  Transfer From 2: Stand to  Transfer to 2: Sit  Transfer Device 2: Walker  Transfer Level of Assistance 2: Contact guard  Ambulation/Gait Training  Ambulation/Gait Training Performed: Yes  Ambulation/Gait Training 1  Device 1: Rolling walker  Gait Support Devices: Gait belt  Assistance 1: Contact guard  Quality of Gait 1: Antalgic, Forward flexed posture, Decreased step length (step to gait, decreased suresh. decreased safety awareness with walker positioning)  Comments/Distance (ft) 1: 20 feet x 2        Treatment    Cues for safe hand placement with use of FWW and right LE extension for sit<>stand. Instruction  provided in step to gait pattern with cues given for sequencing and positioning with FWW. Cues given for turning strategy to avoid pivoting on right LE.     Instruction provided in 10 ankle pumps, quad sets, glute sets right LE in order to maximize strength and circulation. Cues given for proper technique and parameters.       Extremity/Trunk Assessments:        RLE   RLE : Exceptions to WFL  AROM RLE (degrees)  RLE AROM Comment: WFL except knee impaired  R Knee Flexion 0-130: 60  R Knee Extension 0-130:  (20 from 0)  Strength RLE  RLE Overall Strength: Greater than or equal to 3/5 as evidenced by functional mobility  LLE   LLE : Within Functional Limits    Outcome Measures:  [facility] Basic Mobility  Turning from your back to your side while in a flat bed without using bedrails: A little  Moving from lying on your back to sitting on the side of a flat bed without using bedrails: A little  Moving to and from bed to chair (including a wheelchair): A little  Standing up from a chair using your arms (e.g. wheelchair or bedside chair): A little  To walk in hospital room: A little  Climbing 3-5 steps with railing: A lot  Basic Mobility - Total Score: 17                            Goals:  Encounter Problems       Encounter Problems (Active)       PT Problem       Pt will perform bed mobility with mod I.        Start:  01/03/24    Expected End:  01/17/24            Pt will complete sit <> stand and bed <> chair transfers with mod I.        Start:  01/03/24    Expected End:  01/17/24            Pt will ambulate 300 feet mod I using FWW with no significant gait deviations.         Start:  01/03/24    Expected End:  01/17/24            Pt will ascend/descend at least 4 stairs using rail and cane or 2 rails SBA in order to navigate home environment.         Start:  01/03/24    Expected End:  01/17/24            Pt will verbalize and demonstrate understanding of TKA supine/seated exercises.        Start:  01/03/24    Expected End:   01/17/24                     Education Documentation  Precautions, taught by Ilana Lanza, PT at 1/3/2024  4:39 PM.  Learner: Patient  Readiness: Acceptance  Method: Explanation, Demonstration  Response: Verbalizes Understanding, Needs Reinforcement    Body Mechanics, taught by Ilana Lanza, PT at 1/3/2024  4:39 PM.  Learner: Patient  Readiness: Acceptance  Method: Explanation, Demonstration  Response: Verbalizes Understanding, Needs Reinforcement    Home Exercise Program, taught by Ilana Lanza, PT at 1/3/2024  4:39 PM.  Learner: Patient  Readiness: Acceptance  Method: Explanation, Demonstration  Response: Verbalizes Understanding, Needs Reinforcement    Mobility Training, taught by Ilana Lanza PT at 1/3/2024  4:39 PM.  Learner: Patient  Readiness: Acceptance  Method: Explanation, Demonstration  Response: Verbalizes Understanding, Needs Reinforcement    Education Comments  No comments found.

## 2024-01-03 NOTE — ANESTHESIA POSTPROCEDURE EVALUATION
Patient: Franklin Ronquillo    Procedure Summary       Date: 01/03/24 Room / Location: [facility] OR 02 / Virtual [facility] OR    Anesthesia Start: 0812 Anesthesia Stop: 1055    Procedure: RIGHT TOTAL KNEE REPLACEMENT (Right: Knee) Diagnosis:       Unilateral primary osteoarthritis, right knee      (M17.11 - UNILATERAL PRIMARY OSTEOARTHRITIS, RIGHT KNEE)    Surgeons: Sen Anaya MD Responsible Provider: Bobbi Villela MD    Anesthesia Type: spinal, MAC ASA Status: 3            Anesthesia Type: spinal, MAC    Vitals Value Taken Time   /56 01/03/24 1053   Temp 36.5 °C (97.7 °F) 01/03/24 1053   Pulse 60 01/03/24 1053   Resp 16 01/03/24 1053   SpO2 97 % 01/03/24 1053       Anesthesia Post Evaluation    Patient location during evaluation: PACU  Patient participation: complete - patient participated  Level of consciousness: awake  Pain management: adequate  Airway patency: patent  Cardiovascular status: acceptable  Respiratory status: acceptable  Hydration status: acceptable  Postoperative Nausea and Vomiting: none        No notable events documented.

## 2024-01-03 NOTE — ANESTHESIA PREPROCEDURE EVALUATION
Patient: Franklin Ronquillo    Procedure Information       Anesthesia Start Date/Time: 01/03/24 0812    Procedure: RIGHT TOTAL KNEE REPLACEMENT (Right: Knee)    Location: [facility] OR 02 / Virtual [facility] OR    Surgeons: Sen Anaya MD            Relevant Problems   Cardiovascular   (+) Mixed hyperlipidemia   (+) PVD (peripheral vascular disease) (CMS/[facility])      Endocrine   (+) Type 2 diabetes mellitus with complication, without long-term current use of insulin (CMS/[facility])      /Renal   (+) CKD (chronic kidney disease), stage III (CMS/[facility])      Neuro/Psych   (+) Mononeuritis of lower limb      Pulmonary   (+) COPD with asthma   (+) ERIC (obstructive sleep apnea)      Musculoskeletal   (+) Osteoarthritis of right knee, unspecified osteoarthritis type   (+) Spinal stenosis, lumbar region, without neurogenic claudication       Clinical information reviewed:   Tobacco  Allergies  Meds  Problems  Med Hx  Surg Hx   Fam Hx  Soc   Hx        NPO Detail:  NPO/Void Status  Carbonhydrate Drink Given Prior to Surgery? : Y  Date of Last Liquid: 01/02/24  Time of Last Liquid: 0330  Date of Last Solid: 01/02/24  Time of Last Solid: 1900  Last Intake Type: Clear fluids  Time of Last Void: 0640         Physical Exam    Airway  Mallampati: II  TM distance: >3 FB  Neck ROM: full     Cardiovascular - normal exam  Rhythm: regular  Rate: normal     Dental - normal exam     Pulmonary - normal exam  Breath sounds clear to auscultation     Abdominal   (+) obese  Abdomen: soft  Bowel sounds: normal           Anesthesia Plan    ASA 3     general     The patient is not a current smoker.  Patient was previously instructed to abstain from smoking on day of procedure.  Patient did not smoke on day of procedure.  Education provided regarding risk of obstructive sleep apnea.  intravenous induction   Postoperative administration of opioids is intended.  Anesthetic plan and risks discussed with patient.  Use of blood products discussed with patient who.     Plan discussed with [facility]AA.

## 2024-01-03 NOTE — ANESTHESIA PROCEDURE NOTES
Airway  Date/Time: 1/3/2024 8:30 AM  Urgency: elective      Staffing  Performed: [facility]   Authorized by: Bobbi Villela MD    Performed by: STEPHANIE Woodall  Patient location during procedure: OR    Indications and Patient Condition  Indications for airway management: anesthesia      Final Airway Details  Final airway type: mask                 Called pt to let her know to refer to her urologist for refills on potassium med, since per pt she was last advised by her urologist to resume taking this med. Pt verbalized understanding and stated she has already called her urologist for a refill.

## 2024-01-03 NOTE — BRIEF OP NOTE
Date: 1/3/2024  OR Location: [facility] OR    Name: Franklin Ronquillo, : 1950, Age: 73 y.o., MRN: [id number], Sex: male    Diagnosis  Pre-op Diagnosis     * Unilateral primary osteoarthritis, right knee [M17.11] Post-op Diagnosis     * Unilateral primary osteoarthritis, right knee [M17.11]     Procedures  RIGHT TOTAL KNEE REPLACEMENT  [id number] - MT ARTHRP KNE CONDYLE&PLATU MEDIAL&LAT COMPARTMENTS      Surgeons      * Sen Anaya - Primary    Resident/Fellow/Other Assistant:  Surgeon(s) and Role:    Procedure Summary  Anesthesia: General  ASA: ASA status not filed in the log.  Anesthesia Staff: Anesthesiologist: MD VICKIE Rivera-AA: STEPHANIE Woodall  TEDDY: Darion Ruelas  Estimated Blood Loss: less than 100mL  Intra-op Medications: * No intraprocedure medications in log *           Anesthesia Record               Intraprocedure I/O Totals          Intake    Propofol Drip 0.00 mL    The total shown is the total volume documented since Anesthesia Start was filed.    Phenylephrine Drip 0.00 mL    The total shown is the total volume documented since Anesthesia Start was filed.    Total Intake 0 mL          Specimen: No specimens collected     Staff:   Circulator: Jalyn Veloz RN  Scrub Person: Cliff Moon; Amilcar Morse          Findings: see operative note    Complications:  None; patient tolerated the procedure well.     Disposition: PACU - hemodynamically stable.  Condition: stable  Specimens Collected: No specimens collected  Attending Attestation:     Sen Anaya  Phone Number: [phone]

## 2024-01-03 NOTE — CARE PLAN
The patient's goals for the shift include      The clinical goals for the shift include  pain control

## 2024-01-04 VITALS
SYSTOLIC BLOOD PRESSURE: 143 MMHG | BODY MASS INDEX: 40.23 KG/M2 | DIASTOLIC BLOOD PRESSURE: 72 MMHG | OXYGEN SATURATION: 96 % | HEIGHT: 72 IN | WEIGHT: 297 LBS | HEART RATE: 60 BPM | TEMPERATURE: 96.4 F | RESPIRATION RATE: 17 BRPM

## 2024-01-04 LAB
ANION GAP SERPL CALC-SCNC: 11 MMOL/L (ref 10–20)
ANION GAP SERPL CALC-SCNC: 14 MMOL/L (ref 10–20)
BUN SERPL-MCNC: 36 MG/DL (ref 6–23)
BUN SERPL-MCNC: 40 MG/DL (ref 6–23)
CALCIUM SERPL-MCNC: 8.7 MG/DL (ref 8.6–10.3)
CALCIUM SERPL-MCNC: 8.8 MG/DL (ref 8.6–10.3)
CHLORIDE SERPL-SCNC: 100 MMOL/L (ref 98–107)
CHLORIDE SERPL-SCNC: 101 MMOL/L (ref 98–107)
CO2 SERPL-SCNC: 27 MMOL/L (ref 21–32)
CO2 SERPL-SCNC: 29 MMOL/L (ref 21–32)
CREAT SERPL-MCNC: 1.48 MG/DL (ref 0.5–1.3)
CREAT SERPL-MCNC: 1.65 MG/DL (ref 0.5–1.3)
ERYTHROCYTE [DISTWIDTH] IN BLOOD BY AUTOMATED COUNT: 12.6 % (ref 11.5–14.5)
GFR SERPL CREATININE-BSD FRML MDRD: 44 ML/MIN/1.73M*2
GFR SERPL CREATININE-BSD FRML MDRD: 50 ML/MIN/1.73M*2
GLUCOSE BLD MANUAL STRIP-MCNC: 190 MG/DL (ref 74–99)
GLUCOSE BLD MANUAL STRIP-MCNC: 221 MG/DL (ref 74–99)
GLUCOSE SERPL-MCNC: 199 MG/DL (ref 74–99)
GLUCOSE SERPL-MCNC: 202 MG/DL (ref 74–99)
HCT VFR BLD AUTO: 36.4 % (ref 41–52)
HGB BLD-MCNC: 12 G/DL (ref 13.5–17.5)
MCH RBC QN AUTO: 29.3 PG (ref 26–34)
MCHC RBC AUTO-ENTMCNC: 33 G/DL (ref 32–36)
MCV RBC AUTO: 89 FL (ref 80–100)
NRBC BLD-RTO: 0 /100 WBCS (ref 0–0)
PLATELET # BLD AUTO: 247 X10*3/UL (ref 150–450)
POTASSIUM SERPL-SCNC: 3.7 MMOL/L (ref 3.5–5.3)
POTASSIUM SERPL-SCNC: 4.2 MMOL/L (ref 3.5–5.3)
RBC # BLD AUTO: 4.09 X10*6/UL (ref 4.5–5.9)
SODIUM SERPL-SCNC: 136 MMOL/L (ref 136–145)
SODIUM SERPL-SCNC: 138 MMOL/L (ref 136–145)
WBC # BLD AUTO: 13.7 X10*3/UL (ref 4.4–11.3)

## 2024-01-04 PROCEDURE — 7100000011 HC EXTENDED STAY RECOVERY HOURLY - NURSING UNIT

## 2024-01-04 PROCEDURE — 85027 COMPLETE CBC AUTOMATED: CPT | Performed by: ORTHOPAEDIC SURGERY

## 2024-01-04 PROCEDURE — 82947 ASSAY GLUCOSE BLOOD QUANT: CPT | Mod: 59

## 2024-01-04 PROCEDURE — 36415 COLL VENOUS BLD VENIPUNCTURE: CPT | Performed by: PHYSICIAN ASSISTANT

## 2024-01-04 PROCEDURE — 80048 BASIC METABOLIC PNL TOTAL CA: CPT | Performed by: PHYSICIAN ASSISTANT

## 2024-01-04 PROCEDURE — 94660 CPAP INITIATION&MGMT: CPT

## 2024-01-04 PROCEDURE — 97116 GAIT TRAINING THERAPY: CPT | Mod: GP,CQ

## 2024-01-04 PROCEDURE — 97110 THERAPEUTIC EXERCISES: CPT | Mod: GP,CQ

## 2024-01-04 PROCEDURE — 2500000001 HC RX 250 WO HCPCS SELF ADMINISTERED DRUGS (ALT 637 FOR MEDICARE OP): Performed by: PHYSICIAN ASSISTANT

## 2024-01-04 PROCEDURE — 80051 ELECTROLYTE PANEL: CPT | Performed by: PHYSICIAN ASSISTANT

## 2024-01-04 PROCEDURE — 2500000001 HC RX 250 WO HCPCS SELF ADMINISTERED DRUGS (ALT 637 FOR MEDICARE OP): Performed by: ORTHOPAEDIC SURGERY

## 2024-01-04 PROCEDURE — 2500000004 HC RX 250 GENERAL PHARMACY W/ HCPCS (ALT 636 FOR OP/ED): Performed by: ORTHOPAEDIC SURGERY

## 2024-01-04 PROCEDURE — 97165 OT EVAL LOW COMPLEX 30 MIN: CPT | Mod: GO

## 2024-01-04 PROCEDURE — 2500000004 HC RX 250 GENERAL PHARMACY W/ HCPCS (ALT 636 FOR OP/ED): Performed by: PHYSICIAN ASSISTANT

## 2024-01-04 PROCEDURE — 99232 SBSQ HOSP IP/OBS MODERATE 35: CPT | Performed by: PHYSICIAN ASSISTANT

## 2024-01-04 RX ORDER — INSULIN GLARGINE 100 [IU]/ML
40 INJECTION, SOLUTION SUBCUTANEOUS EVERY 24 HOURS
Status: DISCONTINUED | OUTPATIENT
Start: 2024-01-04 | End: 2024-01-04 | Stop reason: HOSPADM

## 2024-01-04 RX ORDER — OXYCODONE HYDROCHLORIDE 5 MG/1
5 TABLET ORAL EVERY 6 HOURS PRN
Qty: 28 TABLET | Refills: 0 | Status: SHIPPED | OUTPATIENT
Start: 2024-01-04 | End: 2024-01-11

## 2024-01-04 RX ORDER — METFORMIN HYDROCHLORIDE 1000 MG/1
1000 TABLET ORAL
Status: DISCONTINUED | OUTPATIENT
Start: 2024-01-04 | End: 2024-01-04 | Stop reason: HOSPADM

## 2024-01-04 RX ORDER — ASPIRIN 81 MG/1
81 TABLET ORAL 2 TIMES DAILY
Qty: 60 TABLET | Refills: 0 | Status: SHIPPED | OUTPATIENT
Start: 2024-01-04 | End: 2024-02-03

## 2024-01-04 RX ADMIN — OXYCODONE HYDROCHLORIDE 10 MG: 10 TABLET ORAL at 09:54

## 2024-01-04 RX ADMIN — POLYETHYLENE GLYCOL 3350 17 G: 17 POWDER, FOR SOLUTION ORAL at 08:45

## 2024-01-04 RX ADMIN — OXYCODONE HYDROCHLORIDE 10 MG: 10 TABLET ORAL at 05:15

## 2024-01-04 RX ADMIN — FAMOTIDINE 20 MG: 20 TABLET, FILM COATED ORAL at 08:44

## 2024-01-04 RX ADMIN — FELODIPINE 10 MG: 5 TABLET, FILM COATED, EXTENDED RELEASE ORAL at 08:43

## 2024-01-04 RX ADMIN — INSULIN HUMAN 1 UNITS: 100 INJECTION, SOLUTION PARENTERAL at 13:37

## 2024-01-04 RX ADMIN — OXYCODONE HYDROCHLORIDE 10 MG: 10 TABLET ORAL at 15:59

## 2024-01-04 RX ADMIN — ACETAMINOPHEN 650 MG: 325 TABLET ORAL at 05:15

## 2024-01-04 RX ADMIN — SODIUM CHLORIDE 1000 ML: 9 INJECTION, SOLUTION INTRAVENOUS at 09:45

## 2024-01-04 RX ADMIN — OXYCODONE HYDROCHLORIDE AND ACETAMINOPHEN 500 MG: 500 TABLET ORAL at 05:16

## 2024-01-04 RX ADMIN — INSULIN HUMAN 2 UNITS: 100 INJECTION, SOLUTION PARENTERAL at 08:46

## 2024-01-04 RX ADMIN — METFORMIN HYDROCHLORIDE 1000 MG: 1000 TABLET ORAL at 08:44

## 2024-01-04 RX ADMIN — ACETAMINOPHEN 650 MG: 325 TABLET ORAL at 13:36

## 2024-01-04 RX ADMIN — ATENOLOL 50 MG: 50 TABLET ORAL at 08:44

## 2024-01-04 RX ADMIN — ACETAMINOPHEN 650 MG: 325 TABLET ORAL at 01:03

## 2024-01-04 RX ADMIN — OXYCODONE HYDROCHLORIDE 10 MG: 10 TABLET ORAL at 01:03

## 2024-01-04 RX ADMIN — ASPIRIN 81 MG: 81 TABLET, COATED ORAL at 08:44

## 2024-01-04 RX ADMIN — LOSARTAN POTASSIUM: 100 TABLET, FILM COATED ORAL at 08:43

## 2024-01-04 ASSESSMENT — PAIN SCALES - GENERAL
PAINLEVEL_OUTOF10: 7
PAINLEVEL_OUTOF10: 10 - WORST POSSIBLE PAIN
PAINLEVEL_OUTOF10: 6
PAINLEVEL_OUTOF10: 7
PAINLEVEL_OUTOF10: 7
PAINLEVEL_OUTOF10: 3
PAINLEVEL_OUTOF10: 6

## 2024-01-04 ASSESSMENT — COGNITIVE AND FUNCTIONAL STATUS - GENERAL
MOVING TO AND FROM BED TO CHAIR: A LITTLE
DRESSING REGULAR LOWER BODY CLOTHING: A LITTLE
CLIMB 3 TO 5 STEPS WITH RAILING: A LITTLE
STANDING UP FROM CHAIR USING ARMS: A LITTLE
TOILETING: A LITTLE
STANDING UP FROM CHAIR USING ARMS: A LITTLE
TURNING FROM BACK TO SIDE WHILE IN FLAT BAD: A LITTLE
HELP NEEDED FOR BATHING: A LITTLE
CLIMB 3 TO 5 STEPS WITH RAILING: A LITTLE
MOVING FROM LYING ON BACK TO SITTING ON SIDE OF FLAT BED WITH BEDRAILS: A LITTLE
WALKING IN HOSPITAL ROOM: A LITTLE
MOBILITY SCORE: 18
WALKING IN HOSPITAL ROOM: A LITTLE
DRESSING REGULAR UPPER BODY CLOTHING: A LITTLE
MOVING TO AND FROM BED TO CHAIR: A LITTLE
MOBILITY SCORE: 18
DAILY ACTIVITIY SCORE: 20
TURNING FROM BACK TO SIDE WHILE IN FLAT BAD: A LITTLE
MOVING FROM LYING ON BACK TO SITTING ON SIDE OF FLAT BED WITH BEDRAILS: A LITTLE

## 2024-01-04 ASSESSMENT — PAIN - FUNCTIONAL ASSESSMENT
PAIN_FUNCTIONAL_ASSESSMENT: 0-10

## 2024-01-04 ASSESSMENT — ACTIVITIES OF DAILY LIVING (ADL): ADL_ASSISTANCE: NEEDS ASSISTANCE

## 2024-01-04 NOTE — PROGRESS NOTES
Occupational Therapy    Occupational Therapy    Evaluation    Patient Name: Franklin Ronquillo  MRN: [id number]  Today's Date: 1/4/2024  Time Calculation  Start Time: 0935  Stop Time: 0952  Time Calculation (min): 17 min    Assessment  IP OT Assessment  OT Assessment:  (Pt. presents with decreased ADLs following TKA.)  Prognosis: Good  Evaluation/Treatment Tolerance: Patient tolerated treatment well  End of Session Communication: Bedside nurse  End of Session Patient Position: Up in chair, Alarm on    Plan:  Treatment Interventions: ADL retraining, Functional transfer training  OT Frequency: Daily  OT Discharge Recommendations: Low intensity level of continued care  OT Recommended Transfer Status: Stand by assist  OT - OK to Discharge: Yes (Next level of care when cleared by medical team)    Subjective     Current Problem:  S/p Right TKA  This is a 73 year old man with known COPD, DM2, HTN, HPL insomnia, CHB s/p PPM who presented for an elective TKR that was uneventful from record review.  Medicine is consulted for medical management of chronic issues including COPD and well-controlled type II DM that are followed closely outpatient; patient had a medical optimization with his PCP at [facility], Dr. Leung, on 12/14/23.  Patient states he feels well, he has been up to use the restroom, pain is less than when he had his Left TKR.  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   General:  General  Reason for Referral: ADLs, discharge planning  Referred By: Dr. Anaya  Prior to Session Communication: Bedside nurse  Patient Position Received: Up in chair, Alarm on    Precautions:  LE Weight Bearing Status: Weight Bearing as Tolerated  Medical Precautions: Fall precautions  Post-Surgical Precautions: Right total knee precautions    Pain:  Pain Assessment  Pain Assessment: 0-10  Pain Score: 6  Pain Type: Surgical  pain  Pain Location: Knee  Pain Orientation: Right    Objective     Cognition:  Overall Cognitive Status: Within Functional Limits  Orientation Level: Oriented X4        Home Living:  Home Living Comments:  (Pt. lives with spouse in home with 3+1 entry steps with bedroom and bathroom on first floor with tub shower, has grab bars and shower chair. PLOF independent with mobility, assist with LB dressing. Owns ww and cane)     Prior Function:  ADL Assistance: Needs assistance  Ambulatory Assistance: Independent      ADL:  Grooming Assistance:  (SUP)  Grooming Deficit: Supervision/safety, Standing with assistive device, Wash/dry hands, Teeth care, Wash/dry face  LE Dressing Assistance: Minimal  LE Dressing Deficit: Thread RLE into underwear, Thread LLE into underwear, Pull up over hips, Supervision/safety    Activity Tolerance:  Endurance: Endurance does not limit participation in activity    Bed Mobility/Transfers:      Transfers  Transfer:  (sit<>stand SUP)    Ambulation/Gait Training:  Ambulation/Gait Training  Ambulation/Gait Training Performed:  (Pt. completes functional mobillity with ww SBA)    Sitting Balance:  Static Sitting Balance  Static Sitting-Balance Support: No upper extremity supported  Static Sitting-Level of Assistance: Independent    Standing Balance:  Static Standing Balance  Static Standing-Balance Support: Bilateral upper extremity supported  Static Standing-Level of Assistance: Close supervision      Sensation:  Sensation Comment:  (Denies numbness)      Hand Function:  Hand Function  Gross Grasp: Functional  Coordination: Functional    Extremities: RUE   RUE : Within Functional Limits and LUE   LUE: Within Functional Limits    Outcome Measures: [facility] Daily Activity  Putting on and taking off regular lower body clothing: A little  Bathing (including washing, rinsing, drying): A little  Putting on and taking off regular upper body clothing: A little  Toileting, which includes using toilet, bedpan  or urinal: A little  Taking care of personal grooming such as brushing teeth: None  Eating Meals: None  Daily Activity - Total Score: 20         EDUCATION:  Education  Individual(s) Educated: Patient  Education Provided: Fall precautons, Risk and benefits of OT discussed with patient or other, POC discussed and agreed upon  Patient Response to Education: Patient/Caregiver Verbalized Understanding of Information      Goals:   Encounter Problems       Encounter Problems (Active)       Dressings Lower Extremities       STG - Patient to complete lower body dressing  MOD I with AE       Start:  01/04/24    Expected End:  01/18/24               Grooming       STG - Patient completes grooming MOD I       Start:  01/04/24    Expected End:  01/18/24            STG - Patient will tolerate standing 4-8 min       Start:  01/04/24    Expected End:  01/18/24               Transfers       STG - Patient will perform toilet transfer MOD I       Start:  01/04/24    Expected End:  01/18/24

## 2024-01-04 NOTE — DISCHARGE INSTRUCTIONS
Dr. Anaya Knee Replacement  Call Dr. Anaya for any problems and/or concerns.  *Full weight bearing with walker  *Maintain knee precautions  *No pillow under affected knee  *Raise (elevate) your leg above the level of your heart while you are sitting or lying down (place pillow underneath calf)  *You may shower, do not soak or scrub incision; pat dry  *Change mepilex dressing post-operative day #7 to new dressing  *If you have a polar care cooling device, use as instructed; otherwise  Apply ice to affected knee as needed to minimize swelling, on 20 minutes, off 20 minutes  *Move your toes often to avoid stiffness and to lessen swelling  *Avoid sitting for a long time without moving. Get up to take short walks every 1-2 hours. This is important to improve blood flow and breathing. Ask for help if you feel weak or unsteady  *Continue the coughing and deep breathing exercises that you learned in the hospital    Call Doctor right away for:  *Fever of 100.4 or above, shaking chills  *Stiffness, or inability to move the knee  *Increased swelling in your leg  *Increased redness, tenderness, or swelling in or around the knee incision  *Drainage from the knee incision  *Increased knee pain  *An incision that breaks open  *Chest pain/shortness of breath-call 911    After the procedure you can expect pain, swelling, and limited range of motion    Narcotic pain medication may cause constipation. You may need to take actions to prevent or treat constipation, such as:  -drink enough fluid to keep your urine pale yellow  -take over-the-counter or prescription medicines  -eat foods that are high in fiber, such as beans, whole grains, and fresh fruits and vegetables  -limit foods that are high in fat and processed sugars, such as fried or sweet foods    Take your blood thinner (anticoagulant) as prescribed by your physician to prevent blood clots.   If your physician prescribed JAMES Hose (compression stockings)-wear as instructed as they  will help reduce swelling and the formation of blood clots    Do not use any products that contain nicotine or tobacco, such as cigarettes, e-cigarettes, and chewing tobacco. These can delay healing after surgery. If you need help quitting, ask your health care provider    If you were given a knee immobilizer, please take it home with you and keep it handy for at least 4 weeks just in case any issues arise (i.e. knee buckling)

## 2024-01-04 NOTE — PROGRESS NOTES
Spiritual Care Visit    Clinical Encounter Type  Visited With: Patient  Routine Visit: Introduction  Continue Visiting: No                                            Taxonomy  Intended Effects: Preserve dignity and respect, Promote sense of peace  Methods: Offer spiritual/Jehovah's witness support  Interventions: [place], Share words of hope and inspiration    Patient shared he goes to [employer].  Patient shared its a [employer] that accepts everyone.   listened to the patients story.  Patients wife arrived.   prayed at his request.

## 2024-01-04 NOTE — PROGRESS NOTES
Franklin Ronquillo is a 73 y.o. male presenting with Osteoarthritis of right knee, unspecified osteoarthritis type.    Subjective   Mr. Ronquillo is sitting up in his bed, has just finished breakfast.  He reports moderate right knee discomfort.  He has been up and ambulating with assistance in his room to the bathroom, is voiding without any issues.  He is looking forward to working with therapy and heading home later today.       Objective     Physical Exam  Vitals reviewed.   HENT:      Head: Normocephalic.      Mouth/Throat:      Mouth: Mucous membranes are moist.      Pharynx: Oropharynx is clear.   Eyes:      Extraocular Movements: Extraocular movements intact.   Cardiovascular:      Rate and Rhythm: Normal rate.   Pulmonary:      Effort: Pulmonary effort is normal.   Abdominal:      Palpations: Abdomen is soft.   Musculoskeletal:      Comments: Right knee dressing is dry and intact.  light touch sensation is intact, ankle dorsiflexion/plantar flexion is intact. DP 2+/2 palpable     Skin:     General: Skin is warm and dry.      Capillary Refill: Capillary refill takes less than 2 seconds.   Neurological:      General: No focal deficit present.      Mental Status: He is alert. Mental status is at baseline.   Psychiatric:         Mood and Affect: Mood normal.         Last Recorded Vitals  Blood pressure 143/72, pulse 60, temperature 35.8 °C (96.4 °F), temperature source Temporal, resp. rate 17, height 1.829 m (6'), weight 135 kg (297 lb), SpO2 96 %.  Intake/Output last 3 Shifts:  I/O last 3 completed shifts:  In: 4324.6 (32.1 mL/kg) [P.O.:1320; I.V.:1804.6 (13.4 mL/kg); IV Piggyback:1200]  Out: 25 (0.2 mL/kg) [Blood:25]  Weight: 134.7 kg     Relevant Results      Scheduled medications  acetaminophen, 650 mg, oral, q6h GABBIE  ascorbic acid, 500 mg, oral, Daily  aspirin, 81 mg, oral, BID  atenolol, 50 mg, oral, Daily  ezetimibe, 10 mg, oral, Nightly  famotidine, 20 mg, oral, Daily  felodipine ER, 10 mg, oral,  Daily  insulin glargine, 40 Units, subcutaneous, q24h  insulin regular, 0-5 Units, subcutaneous, TID with meals  losartan 100 mg, hydroCHLOROthiazide 25 mg for Hyzaar 100/25, , oral, Daily  metFORMIN, 1,000 mg, oral, BID with meals  polyethylene glycol, 17 g, oral, Daily      Continuous medications  oxygen, 2 L/min, Last Rate: Stopped (01/03/24 1300)  sodium chloride 0.9%, 100 mL/hr, Last Rate: 100 mL/hr (01/03/24 1759)      PRN medications  PRN medications: albuterol, budesonide, dextrose 10 % in water (D10W), dextrose, diphenhydrAMINE, formoterol, glucagon, HYDROmorphone, naloxone, ondansetron ODT **OR** ondansetron, oxyCODONE, oxyCODONE, oxygen  Results for orders placed or performed during the hospital encounter of 01/03/24 (from the past 24 hour(s))   POCT GLUCOSE   Result Value Ref Range    POCT Glucose 154 (H) 74 - 99 mg/dL   POCT GLUCOSE   Result Value Ref Range    POCT Glucose 265 (H) 74 - 99 mg/dL   POCT GLUCOSE   Result Value Ref Range    POCT Glucose 303 (H) 74 - 99 mg/dL   CBC   Result Value Ref Range    WBC 13.7 (H) 4.4 - 11.3 x10*3/uL    nRBC 0.0 0.0 - 0.0 /100 WBCs    RBC 4.09 (L) 4.50 - 5.90 x10*6/uL    Hemoglobin 12.0 (L) 13.5 - 17.5 g/dL    Hematocrit 36.4 (L) 41.0 - 52.0 %    MCV 89 80 - 100 fL    MCH 29.3 26.0 - 34.0 pg    MCHC 33.0 32.0 - 36.0 g/dL    RDW 12.6 11.5 - 14.5 %    Platelets 247 150 - 450 x10*3/uL   Basic Metabolic Panel   Result Value Ref Range    Glucose 202 (H) 74 - 99 mg/dL    Sodium 138 136 - 145 mmol/L    Potassium 4.2 3.5 - 5.3 mmol/L    Chloride 101 98 - 107 mmol/L    Bicarbonate 27 21 - 32 mmol/L    Anion Gap 14 10 - 20 mmol/L    Urea Nitrogen 40 (H) 6 - 23 mg/dL    Creatinine 1.65 (H) 0.50 - 1.30 mg/dL    eGFR 44 (L) >60 mL/min/1.73m*2    Calcium 8.8 8.6 - 10.3 mg/dL   POCT GLUCOSE   Result Value Ref Range    POCT Glucose 221 (H) 74 - 99 mg/dL                   Assessment/Plan   Principal Problem:    Osteoarthritis of right knee, unspecified osteoarthritis  type  Osteoarthritis right knee  POD #1 s/p  right TKA  Continue PT/OT, WBAT right  LE  Reviewed am labs  Multimodal pain regimen  knee dressing to be removed on post op day #7  VTE prophylaxis: ASA 81mg BID  Will discharge home when [facility] with [facility]  Follow up with Dr. Anaya  as directed    2. JOSUÉ  Preoperative creatinine was 1.18  Creatinine today is 1.65, consistent with JOSUÉ  Medications reviewed, avoiding nephrotoxic medication  Will give normal saline IV fluid bolus  BMP this afternoon to trend creatinine         I spent 25  minutes in the professional and overall care of this patient.      Adwoa Nielsen PA-C

## 2024-01-04 NOTE — PROGRESS NOTES
Physical Therapy    Physical Therapy Treatment    Patient Name: Franklin Ronquillo  MRN: [id number]  Today's Date: 1/4/2024  Time Calculation  Start Time: 0858  Stop Time: 0932  Time Calculation (min): 34 min       Assessment/Plan   PT Assessment  PT Assessment Results: Decreased strength, Decreased range of motion, Decreased endurance, Impaired balance, Decreased mobility, Orthopedic restrictions, Pain  Rehab Prognosis: Good  Evaluation/Treatment Tolerance: Patient tolerated treatment well  Medical Staff Made Aware: Yes  End of Session Communication: Bedside nurse  Assessment Comment:   End of Session Patient Position: Up in chair, Alarm on  PT Plan  Inpatient/Swing Bed or Outpatient: Inpatient  PT Plan  Treatment/Interventions: Bed mobility, Transfer training, Gait training, Stair training, Balance training, Neuromuscular re-education, Strengthening, Endurance training, Range of motion, Therapeutic exercise, Therapeutic activity  PT Plan: Skilled PT  PT Frequency: BID  PT Discharge Recommendations: Low intensity level of continued care  PT Recommended Transfer Status: Assist x1 (FWW, gait belt)  PT - OK to Discharge: Yes      01/04/24 0858   PT  Visit   PT Received On 01/04/24   Response to Previous Treatment Patient with no complaints from previous session.   General   Reason for Referral TKA   Referred By Sen Anaya MD   Past Medical History Relevant to Rehab Pt admitted 1/3/24 following right TKA. PMH: CKD, COPD PVD, T2DM, PM   Prior to Session Communication Bedside nurse   Patient Position Received Bed, 3 rail up;Alarm on   Preferred Learning Style verbal;visual   General Comment Pt. pleasant and agreeable to tx.   Pain Assessment   Pain Assessment 0-10   Pain Score 6   Pain Type Surgical pain   Pain Location Knee   Pain Orientation Right   Cognition   Overall Cognitive Status WFL   Orientation Level Oriented X4   Therapeutic Exercise   Therapeutic Exercise Performed Yes   Therapeutic Exercise Activity 1 AP X10    Therapeutic Exercise Activity 2 LAQ X10   Therapeutic Exercise Activity 3 Ball squeezes x10   Therapeutic Exercise Activity 4 Glute sets x10   Bed Mobility   Bed Mobility Yes   Bed Mobility 1   Bed Mobility 1 Supine to sitting   Level of Assistance 1 Minimum assistance   Bed Mobility Comments 1 Pt. required minimal assistance to bring legs over EOB to sitting.  (VC required for hand placement)   Ambulation/Gait Training   Ambulation/Gait Training Performed Yes   Ambulation/Gait Training 1   Surface 1 Level tile   Device 1 Rolling walker   Gait Support Devices Gait belt   Assistance 1 Contact guard   Quality of Gait 1 Decreased step length;Forward flexed posture   Comments/Distance (ft) 1 40 ft. x1, 180 ft. x1   Transfers   Transfer Yes   Transfer 1   Transfer From 1 Sit to;Stand to   Transfer to 1 Toilet   Technique 1 Sit to stand;Stand to sit   Transfer Device 1 Gait belt   Transfer Level of Assistance 1 Contact guard   Transfers 2   Transfer From 2 Sit to;Stand to   Transfer to 2 Chair with arms   Technique 2 Sit to stand;Stand to sit   Transfer Device 2 Walker   Transfer Level of Assistance 2 Contact guard   Trials/Comments 2 VC required for hand placement and to step within the walker for increased safety   Stairs   Stairs Yes   Stairs   Rails 1 Bilateral   Curb Step 1 Yes   Device 1 No device  (Pt. stated he feels most comfortable using the railing when using the stairs. Pt. also prefers to walk down the stairs backwards with using both handrails. Educated pt. on safer technique and explained to him the reasoning why. Pt. showed understanding.)   Support Devices 1 Gait belt   Assistance 1 Minimum assistance   Comment/Number of Steps 1 3 x2   PT Assessment   PT Assessment Results Decreased strength;Decreased range of motion;Decreased endurance;Impaired balance;Decreased mobility;Orthopedic restrictions;Pain   Rehab Prognosis Good   Evaluation/Treatment Tolerance Patient tolerated treatment well   Medical Staff  Made Aware Yes   End of Session Communication Bedside nurse   End of Session Patient Position Up in chair;Alarm on   PT Plan   Inpatient/Swing Bed or Outpatient Inpatient   PT Plan   Treatment/Interventions Bed mobility;Transfer training;Gait training;Stair training;Balance training;Neuromuscular re-education;Strengthening;Endurance training;Range of motion;Therapeutic exercise;Therapeutic activity   PT Plan Skilled PT   PT Frequency BID   PT Discharge Recommendations Low intensity level of continued care   PT Recommended Transfer Status Assist x1  (FWW, gait belt)     Outcome Measures:  [facility] Basic Mobility  Turning from your back to your side while in a flat bed without using bedrails: A little  Moving from lying on your back to sitting on the side of a flat bed without using bedrails: A little  Moving to and from bed to chair (including a wheelchair): A little  Standing up from a chair using your arms (e.g. wheelchair or bedside chair): A little  To walk in hospital room: A little  Climbing 3-5 steps with railing: A little  Basic Mobility - Total Score: 18  Education Documentation  Precautions, taught by Katy Leyva PTA at 1/4/2024 12:33 PM.  Learner: Patient  Readiness: Acceptance  Method: Explanation  Response: Verbalizes Understanding    Body Mechanics, taught by Katy Leyva PTA at 1/4/2024 12:33 PM.  Learner: Patient  Readiness: Acceptance  Method: Explanation  Response: Verbalizes Understanding    Home Exercise Program, taught by Katy Leyva PTA at 1/4/2024 12:33 PM.  Learner: Patient  Readiness: Acceptance  Method: Explanation  Response: Verbalizes Understanding    Mobility Training, taught by Katy Leyva PTA at 1/4/2024 12:33 PM.  Learner: Patient  Readiness: Acceptance  Method: Explanation  Response: Verbalizes Understanding    Education Comments  No comments found.      EDUCATION:       GOALS:  Encounter Problems       Encounter Problems (Active)       PT Problem       Pt will  perform bed mobility with mod I.  (Progressing)       Start:  01/03/24    Expected End:  01/17/24            Pt will complete sit <> stand and bed <> chair transfers with mod I.  (Progressing)       Start:  01/03/24    Expected End:  01/17/24            Pt will ambulate 300 feet mod I using FWW with no significant gait deviations.   (Progressing)       Start:  01/03/24    Expected End:  01/17/24            Pt will ascend/descend at least 4 stairs using rail and cane or 2 rails SBA in order to navigate home environment.   (Progressing)       Start:  01/03/24    Expected End:  01/17/24            Pt will verbalize and demonstrate understanding of TKA supine/seated exercises.  (Progressing)       Start:  01/03/24    Expected End:  01/17/24                   Transfers       STG - Patient will perform toilet transfer MOD I (Progressing)       Start:  01/04/24    Expected End:  01/18/24

## 2024-01-04 NOTE — CARE PLAN
The patient's goals for the shift include      The clinical goals for the shift include pain control    Over the shift, the patient did make progress toward the following goals.

## 2024-01-04 NOTE — DISCHARGE SUMMARY
Discharge summary      This patient Franklin Ronquillo was admitted to the hospital on 1/3/2024  after undergoing Procedure(s) (LRB):  RIGHT TOTAL KNEE REPLACEMENT (Right) without complications.    During the postoperative period,while in hospital, patient was medically managed by the hospitalist. Please see medial notes and H&P for patients additional diagnoses.  Ortho agrees with all medical diagnoses and treatments while patient in hospital.  No significant or unexpected findings or abnormal ortho imaging were noted during the hospital stay    Hospital course      Patient tolerated surgical procedure well and there was no complications. Patient progressed adequately through their recovery during hospital stay including PT and rehabilitation.    Patient was then D/C on  to home  in stable condition.  Patient was instructed on the use of pain medications, the signs and symptoms of infection, and was given our number to call should they have any questions or concerns following discharge.    Based on my clinical judgment, the patient was provided with a 7-day prescription for opioid medication at 30 MED, indicated for treatment of acute pain in the setting of recent right TKA . OARRS report was run and has demonstrated an appropriate time course.  The patient has been provided with counseling pertaining to safe use of opioid medication.    Pertinent Physical Exam At Time of Discharge  Alert, oriented x 3, cooperative with examination.     Examination of the right lower  extremity reveals right knee  dressing is intact without drainage.   EHL, plantarflexion, dorsiflexion are 4+/5.  Sensory intact light touch in the deep/superficial/common peroneal, saphenous, tibial, sural nerve distributions.  DP and popliteal pulses are 2+ with capillary refill less than 2 seconds.        Home Medications     Medication List      START taking these medications     aspirin 81 mg EC tablet; Take 1 tablet (81 mg) by mouth 2 times a  day.   oxyCODONE 5 mg immediate release tablet; Commonly known as: Roxicodone;   Take 1 tablet (5 mg) by mouth every 6 hours if needed for moderate pain (4   - 6) or severe pain (7 - 10) for up to 7 days.     CONTINUE taking these medications     * albuterol 90 mcg/actuation inhaler   * albuterol 2.5 mg /3 mL (0.083 %) nebulizer solution   ALLEGRA ORAL   ascorbic acid 500 mg tablet; Commonly known as: Vitamin C   atenolol 50 mg tablet; Commonly known as: Tenormin   atorvastatin 40 mg tablet; Commonly known as: Lipitor   cholecalciferol 50 mcg (2,000 unit) capsule; Commonly known as: Vitamin   D-3   ezetimibe 10 mg tablet; Commonly known as: Zetia   famotidine 20 mg tablet; Commonly known as: Pepcid   felodipine ER 5 mg 24 hr tablet; Commonly known as: Plendil   losartan-hydrochlorothiazide 100-25 mg tablet; Commonly known as: Hyzaar   metFORMIN 500 mg tablet; Commonly known as: Glucophage   nystatin-triamcinolone cream; Commonly known as: Mycolog II   pregabalin 75 mg capsule; Commonly known as: Lyrica   Tresiba FlexTouch U-100 100 unit/mL (3 mL) injection; Generic drug:   insulin degludec   Trulicity 3 mg/0.5 mL pen injector; Generic drug: dulaglutide   Wixela Inhub 500-50 mcg/dose diskus inhaler; Generic drug: fluticasone   propion-salmeteroL  * This list has 2 medication(s) that are the same as other medications   prescribed for you. Read the directions carefully, and ask your doctor or   other care provider to review them with you.     STOP taking these medications     chlorhexidine 0.12 % solution; Commonly known as: Peridex   garlic tablet   omega 3-dha-epa-fish oil-krill 700 mg-600 mg- 900 mg capsule   VITAMIN E ORAL           Patient may bear weight as tolerated to operative extremity with use of walker for assistance with ambulation   Mepilex dressing to be removed pod7 and incision left open to air  Aspirin 81 mg twice a day for DVT prophylaxis started on 1/5 and to be taken for 30 days  Follow up with  surgeon in 2 weeks          Adwoa Nielsen PA-C

## 2024-01-04 NOTE — ANESTHESIA PROCEDURE NOTES
Peripheral Block    Patient location during procedure: pre-op  Start time: 1/3/2024 11:12 AM  End time: 1/3/2024 11:23 AM  Reason for block: at surgeon's request and post-op pain management  Staffing  Performed: attending   Authorized by: Bobbi Villela MD    Performed by: Bobbi Villela MD  Preanesthetic Checklist  Completed: patient identified, IV checked, site marked, risks and benefits discussed, surgical consent, monitors and equipment checked, pre-op evaluation and timeout performed   Timeout performed at: 1/3/2024 11:09 AM  Peripheral Block  Patient position: laying flat  Prep: ChloraPrep  Patient monitoring: heart rate, cardiac monitor and continuous pulse ox  Block type: adductor canal  Laterality: right  Injection technique: single-shot  Guidance: ultrasound guided  Local infiltration: lidocaine  Infiltration strength: 2 %  Dose: 5 mL  Needle  Needle type: pencil-tip   Needle gauge: 21 G  Needle length: 10 cm  Needle localization: ultrasound guidance and anatomical landmarks  Needle insertion depth: 7 cm  Test dose: negative  Assessment  Injection assessment: negative aspiration for heme, no paresthesia on injection, incremental injection and local visualized surrounding nerve on ultrasound  Paresthesia pain: none  Heart rate change: no  Slow fractionated injection: yes  Additional Notes  30 ml of 0.5% Bupivacaine with Epinephrine 1:200 000 mixed with 100 mcg of Clonidine and 10 mg of Decadron. Slow, incremental injection of the mix: 28 ml into the Adductor Canal, the remaining 4 ml used for the Anterior femoral Cutaneous Nerve Field Block with consistently negative aspirations between bouts of injections. Patient tolerated procedure well without complications.

## 2024-01-26 LAB
ATRIAL RATE: 241 BPM
P OFFSET: 209 MS
P ONSET: 173 MS
Q ONSET: 217 MS
QRS COUNT: 10 BEATS
QRS DURATION: 142 MS
QT INTERVAL: 444 MS
QTC CALCULATION(BAZETT): 450 MS
QTC FREDERICIA: 449 MS
R AXIS: 270 DEGREES
T AXIS: 82 DEGREES
T OFFSET: 439 MS
VENTRICULAR RATE: 62 BPM

## 2024-04-02 ENCOUNTER — TELEPHONE (OUTPATIENT)
Dept: INPATIENT UNIT | Facility: HOSPITAL | Age: 74
End: 2024-04-02
Payer: MEDICARE

## 2025-01-03 ENCOUNTER — TELEPHONE (OUTPATIENT)
Dept: INPATIENT UNIT | Facility: HOSPITAL | Age: 75
End: 2025-01-03
Payer: MEDICARE

## 2025-01-03 ASSESSMENT — KOOS JR: KOOS JR SCORING: 100

## 2025-01-03 NOTE — TELEPHONE ENCOUNTER
PRO returned via mail, entered into chart.   1 year JOHNNIE BOOKER returned. Right knee TJR 1/3/24  JR JOHNNIE entered into chart

## (undated) DEVICE — SOLUTION, IRRIGATION, USP, SODIUM CHLORIDE 0.9%, 3000 ML, BAG

## (undated) DEVICE — GOWN, ASTOUND, XL

## (undated) DEVICE — COVER HANDLE LIGHT, STERIS, BLUE, STERILE

## (undated) DEVICE — SUTURE, VICRYL, 2-0, 36 IN, CT-1, UNDYED

## (undated) DEVICE — DRAPE, INSTRUMENT, W/POUCH, STERI DRAPE, 7 X 11 IN, DISPOSABLE, STERILE

## (undated) DEVICE — DRESSING, ABDOMINAL, WET PRUF, TENDERSORB, 5 X 9 IN, STERILE

## (undated) DEVICE — DRAPE, SHEET, THREE QUARTER, FAN FOLD, 57 X 77 IN

## (undated) DEVICE — GOWN, SURGICAL, SMARTGOWN, XX-LARGE, STERILE

## (undated) DEVICE — GLOVE, SURGICAL, PROTEXIS PI , 7.5, PF, LF

## (undated) DEVICE — SUTURE, VICRYL, 1, 27 IN, CT-1 CR, UNDYED

## (undated) DEVICE — TRAY, SURESTEP, SILICONE DRAINAGE BAG, STATLOCK, 16FR

## (undated) DEVICE — SKIN CLOSURE SYS, PREMIERPRO EXOFIN, 1-4CM X 22CM, 1.75G TUBE

## (undated) DEVICE — SOLUTION, IRRIGATION, SODIUM CHLORIDE 0.9%, 1000 ML, POUR BOTTLE

## (undated) DEVICE — BANDAGE, COMPRESSION, W/CLIP, FLEX-MASTER, DOUBLE LENGTH, 6 IN X 11 YD, LF

## (undated) DEVICE — BLADE, SAW, SAGITTAL, 25.0 X 1.27 X 90MM

## (undated) DEVICE — SOLUTION, IRRIGATION, STERILE WATER, 1000 ML, POUR BOTTLE

## (undated) DEVICE — DRESSING, MEPILEX BORDER, POST-OP AG, 4 X 12 IN

## (undated) DEVICE — SUTURE, STRATAFIX, SPIRAL MONOCRYL PLUS, 3-0, PS-2 45CM, UNDYED

## (undated) DEVICE — Device

## (undated) DEVICE — GLOVE, SURGICAL, PROTEXIS PI W/NEU-THERA, 8.0, PF, LF

## (undated) DEVICE — TOWEL PACK, STERILE, 4/PACK, BLUE

## (undated) DEVICE — HOOD, STERISHIELD T4 SYSTEM

## (undated) DEVICE — SUTURE, STARTAFIX, SYMMETRIC, PDS PLUS, 60CM CT-1